# Patient Record
Sex: FEMALE | Race: WHITE | NOT HISPANIC OR LATINO | Employment: FULL TIME | URBAN - METROPOLITAN AREA
[De-identification: names, ages, dates, MRNs, and addresses within clinical notes are randomized per-mention and may not be internally consistent; named-entity substitution may affect disease eponyms.]

---

## 2023-05-30 ENCOUNTER — TELEPHONE (OUTPATIENT)
Dept: PHARMACY | Facility: HOSPITAL | Age: 73
End: 2023-05-30
Payer: MEDICARE

## 2023-05-30 NOTE — TELEPHONE ENCOUNTER
Population Health: Outreach by Ambulatory Pharmacy Team    Payor: [place] MENEZES  Reason: Adherence  Medication: Rosuvastatin 10mg and Lisinopril/Hydrochlorothiazide 10/12.5mg  Outcome: Left Voicemail    Please reach out to the Clinical Pharmacy Team if there are any further questions.     Natasha Hanson, PharmD   Meds PGY1 Pharmacy Resident   [phone]

## 2023-09-21 PROBLEM — M81.0 AGE RELATED OSTEOPOROSIS: Status: ACTIVE | Noted: 2023-09-21

## 2023-09-21 PROBLEM — L57.0 ACTINIC KERATOSIS: Status: ACTIVE | Noted: 2023-08-09

## 2023-09-21 PROBLEM — C50.912 BILATERAL BREAST CANCER (MULTI): Status: ACTIVE | Noted: 2023-09-21

## 2023-09-21 PROBLEM — D18.01 HEMANGIOMA OF SKIN AND SUBCUTANEOUS TISSUE: Status: ACTIVE | Noted: 2023-08-09

## 2023-09-21 PROBLEM — D22.5 MELANOCYTIC NEVI OF TRUNK: Status: ACTIVE | Noted: 2023-08-09

## 2023-09-21 PROBLEM — R79.89 LOW VITAMIN D LEVEL: Status: ACTIVE | Noted: 2023-09-21

## 2023-09-21 PROBLEM — L30.9 DERMATITIS, UNSPECIFIED: Status: ACTIVE | Noted: 2023-08-09

## 2023-09-21 PROBLEM — N90.89 VULVAL LESION: Status: ACTIVE | Noted: 2023-09-21

## 2023-09-21 PROBLEM — L85.3 XEROSIS CUTIS: Status: ACTIVE | Noted: 2023-08-09

## 2023-09-21 PROBLEM — L82.1 OTHER SEBORRHEIC KERATOSIS: Status: ACTIVE | Noted: 2023-08-09

## 2023-09-21 PROBLEM — L91.8 OTHER HYPERTROPHIC DISORDERS OF THE SKIN: Status: ACTIVE | Noted: 2023-08-09

## 2023-09-21 PROBLEM — R73.01 ELEVATED FASTING GLUCOSE: Status: ACTIVE | Noted: 2023-09-21

## 2023-09-21 PROBLEM — L81.4 OTHER MELANIN HYPERPIGMENTATION: Status: ACTIVE | Noted: 2023-08-09

## 2023-09-21 PROBLEM — I10 ACCELERATED HYPERTENSION: Status: ACTIVE | Noted: 2023-09-21

## 2023-09-21 PROBLEM — E78.00 HYPERCHOLESTEROLEMIA: Status: ACTIVE | Noted: 2023-09-21

## 2023-09-21 PROBLEM — E55.9 VITAMIN D DEFICIENCY: Status: ACTIVE | Noted: 2023-09-21

## 2023-09-21 PROBLEM — C50.911 BILATERAL BREAST CANCER (MULTI): Status: ACTIVE | Noted: 2023-09-21

## 2023-09-21 PROBLEM — D22.70 MELANOCYTIC NEVI OF UNSPECIFIED LOWER LIMB, INCLUDING HIP: Status: ACTIVE | Noted: 2023-08-09

## 2023-09-21 PROBLEM — L73.8 OTHER SPECIFIED FOLLICULAR DISORDERS: Status: ACTIVE | Noted: 2023-08-09

## 2023-09-21 PROBLEM — I10 BENIGN ESSENTIAL HYPERTENSION: Status: ACTIVE | Noted: 2023-09-21

## 2023-09-21 PROBLEM — D23.9 OTHER BENIGN NEOPLASM OF SKIN, UNSPECIFIED: Status: ACTIVE | Noted: 2023-08-09

## 2023-09-21 PROBLEM — L71.8 ACNE ROSACEA, PAPULAR TYPE: Status: ACTIVE | Noted: 2023-09-21

## 2023-09-21 PROBLEM — D22.60 MELANOCYTIC NEVI OF UNSPECIFIED UPPER LIMB, INCLUDING SHOULDER: Status: ACTIVE | Noted: 2023-08-09

## 2023-09-21 RX ORDER — ANASTROZOLE 1 MG/1
1 TABLET ORAL DAILY
COMMUNITY

## 2023-09-21 RX ORDER — LISINOPRIL AND HYDROCHLOROTHIAZIDE 10; 12.5 MG/1; MG/1
1 TABLET ORAL DAILY
COMMUNITY
Start: 2021-06-11 | End: 2023-10-13 | Stop reason: SDUPTHER

## 2023-09-21 RX ORDER — LANOLIN ALCOHOL/MO/W.PET/CERES
1 CREAM (GRAM) TOPICAL DAILY
COMMUNITY
End: 2023-10-26 | Stop reason: ALTCHOICE

## 2023-09-21 RX ORDER — FLUTICASONE PROPIONATE 50 MCG
1 SPRAY, SUSPENSION (ML) NASAL 2 TIMES DAILY
COMMUNITY
Start: 2022-09-30 | End: 2023-10-26 | Stop reason: ALTCHOICE

## 2023-09-21 RX ORDER — MULTIVIT WITH MINERALS/HERBS
1 TABLET ORAL 2 TIMES DAILY
COMMUNITY
Start: 2022-09-30 | End: 2023-10-26 | Stop reason: ALTCHOICE

## 2023-09-21 RX ORDER — TRIAMTERENE/HYDROCHLOROTHIAZID 37.5-25 MG
1 TABLET ORAL EVERY MORNING
COMMUNITY
End: 2023-10-13 | Stop reason: SDUPTHER

## 2023-09-21 RX ORDER — PNV NO.95/FERROUS FUM/FOLIC AC 28MG-0.8MG
1 TABLET ORAL DAILY
COMMUNITY
Start: 2022-09-30 | End: 2023-10-26 | Stop reason: ALTCHOICE

## 2023-09-21 RX ORDER — ACETAMINOPHEN 500 MG
2 TABLET ORAL
COMMUNITY
Start: 2022-09-30

## 2023-09-21 RX ORDER — CHOLECALCIFEROL (VITAMIN D3) 50 MCG
2000 TABLET ORAL DAILY
COMMUNITY

## 2023-09-21 RX ORDER — IBUPROFEN 100 MG/5ML
1 SUSPENSION, ORAL (FINAL DOSE FORM) ORAL DAILY
COMMUNITY
Start: 2022-09-30

## 2023-09-21 RX ORDER — METRONIDAZOLE 7.5 MG/G
1 CREAM TOPICAL
COMMUNITY
Start: 2018-11-19 | End: 2023-10-26 | Stop reason: ALTCHOICE

## 2023-09-21 RX ORDER — ROSUVASTATIN CALCIUM 10 MG/1
10 TABLET, COATED ORAL EVERY EVENING
COMMUNITY
Start: 2022-12-27 | End: 2023-10-13 | Stop reason: SDUPTHER

## 2023-09-21 RX ORDER — MINERAL OIL
1 ENEMA (ML) RECTAL DAILY
COMMUNITY
Start: 2022-09-30 | End: 2023-10-13

## 2023-09-21 RX ORDER — HYDROCORTISONE 25 MG/G
1 CREAM TOPICAL
COMMUNITY
Start: 2019-07-19

## 2023-09-21 RX ORDER — ZINC GLUCONATE 50 MG
1 TABLET ORAL DAILY
COMMUNITY
Start: 2022-09-30 | End: 2023-10-26 | Stop reason: ALTCHOICE

## 2023-10-13 ENCOUNTER — OFFICE VISIT (OUTPATIENT)
Dept: PRIMARY CARE | Facility: CLINIC | Age: 73
End: 2023-10-13
Payer: MEDICARE

## 2023-10-13 VITALS
WEIGHT: 171.96 LBS | RESPIRATION RATE: 16 BRPM | SYSTOLIC BLOOD PRESSURE: 120 MMHG | TEMPERATURE: 98 F | DIASTOLIC BLOOD PRESSURE: 70 MMHG | HEART RATE: 88 BPM | OXYGEN SATURATION: 98 % | BODY MASS INDEX: 29.06 KG/M2

## 2023-10-13 DIAGNOSIS — C50.111 MALIGNANT NEOPLASM OF CENTRAL PORTION OF BOTH BREASTS IN FEMALE, ESTROGEN RECEPTOR POSITIVE (MULTI): ICD-10-CM

## 2023-10-13 DIAGNOSIS — R79.89 LOW VITAMIN D LEVEL: ICD-10-CM

## 2023-10-13 DIAGNOSIS — I10 BENIGN ESSENTIAL HYPERTENSION: Primary | ICD-10-CM

## 2023-10-13 DIAGNOSIS — Z00.00 MEDICARE ANNUAL WELLNESS VISIT, SUBSEQUENT: ICD-10-CM

## 2023-10-13 DIAGNOSIS — C50.112 MALIGNANT NEOPLASM OF CENTRAL PORTION OF BOTH BREASTS IN FEMALE, ESTROGEN RECEPTOR POSITIVE (MULTI): ICD-10-CM

## 2023-10-13 DIAGNOSIS — Z17.0 MALIGNANT NEOPLASM OF CENTRAL PORTION OF BOTH BREASTS IN FEMALE, ESTROGEN RECEPTOR POSITIVE (MULTI): ICD-10-CM

## 2023-10-13 DIAGNOSIS — E78.00 HYPERCHOLESTEROLEMIA: ICD-10-CM

## 2023-10-13 PROCEDURE — 3078F DIAST BP <80 MM HG: CPT | Performed by: INTERNAL MEDICINE

## 2023-10-13 PROCEDURE — 3074F SYST BP LT 130 MM HG: CPT | Performed by: INTERNAL MEDICINE

## 2023-10-13 PROCEDURE — 1170F FXNL STATUS ASSESSED: CPT | Performed by: INTERNAL MEDICINE

## 2023-10-13 PROCEDURE — 99214 OFFICE O/P EST MOD 30 MIN: CPT | Performed by: INTERNAL MEDICINE

## 2023-10-13 PROCEDURE — 1036F TOBACCO NON-USER: CPT | Performed by: INTERNAL MEDICINE

## 2023-10-13 PROCEDURE — G0439 PPPS, SUBSEQ VISIT: HCPCS | Performed by: INTERNAL MEDICINE

## 2023-10-13 PROCEDURE — 1159F MED LIST DOCD IN RCRD: CPT | Performed by: INTERNAL MEDICINE

## 2023-10-13 RX ORDER — TRIAMTERENE/HYDROCHLOROTHIAZID 37.5-25 MG
1 TABLET ORAL EVERY MORNING
Qty: 90 TABLET | Refills: 1 | Status: SHIPPED | OUTPATIENT
Start: 2023-10-13 | End: 2023-10-13 | Stop reason: ALTCHOICE

## 2023-10-13 RX ORDER — LISINOPRIL AND HYDROCHLOROTHIAZIDE 10; 12.5 MG/1; MG/1
1 TABLET ORAL DAILY
Qty: 90 TABLET | Refills: 1 | Status: SHIPPED | OUTPATIENT
Start: 2023-10-13 | End: 2024-05-28

## 2023-10-13 RX ORDER — ROSUVASTATIN CALCIUM 10 MG/1
10 TABLET, COATED ORAL EVERY EVENING
Qty: 90 TABLET | Refills: 1 | Status: SHIPPED | OUTPATIENT
Start: 2023-10-13 | End: 2024-04-26

## 2023-10-13 ASSESSMENT — ENCOUNTER SYMPTOMS
CARDIOVASCULAR NEGATIVE: 1
RESPIRATORY NEGATIVE: 1
GASTROINTESTINAL NEGATIVE: 1
EYES NEGATIVE: 1
CONSTITUTIONAL NEGATIVE: 1
ENDOCRINE NEGATIVE: 1
NEUROLOGICAL NEGATIVE: 1
ALLERGIC/IMMUNOLOGIC NEGATIVE: 1
HEMATOLOGIC/LYMPHATIC NEGATIVE: 1
MUSCULOSKELETAL NEGATIVE: 1
PSYCHIATRIC NEGATIVE: 1

## 2023-10-13 ASSESSMENT — ACTIVITIES OF DAILY LIVING (ADL)
BATHING: INDEPENDENT
ADEQUATE_TO_COMPLETE_ADL: YES
PATIENT'S MEMORY ADEQUATE TO SAFELY COMPLETE DAILY ACTIVITIES?: YES
TOILETING: INDEPENDENT
HEARING - LEFT EAR: FUNCTIONAL
DRESSING YOURSELF: INDEPENDENT
HEARING - RIGHT EAR: FUNCTIONAL
GROOMING: INDEPENDENT
FEEDING YOURSELF: INDEPENDENT
JUDGMENT_ADEQUATE_SAFELY_COMPLETE_DAILY_ACTIVITIES: YES
WALKS IN HOME: INDEPENDENT

## 2023-10-13 NOTE — ASSESSMENT & PLAN NOTE
HTN - hypertension well/controlled .Target BP < 130/80  achieved. Educate low salt diet and exercise with weight loss. Educate home self monitoring and diary keeping. Educate risks of elevate blood pressure and benefits of prompt treatment.  Refill Lisinopril and hydrochlorothiazide

## 2023-10-13 NOTE — PROGRESS NOTES
Subjective   Reason for Visit: Nae Eli is an 73 y.o. female here for a Medicare Wellness visit.          Reviewed all medications by prescribing practitioner or clinical pharmacist (such as prescriptions, OTCs, herbal therapies and supplements) and documented in the medical record.    HPI    Patient Care Team:  Bart Alonso MD as PCP - General  Bart Alonso MD as PCP - [employer]vantage PCP     Review of Systems    Objective   Vitals:  Pulse 88   Temp 36.7 °C (98 °F)   Resp 16   Wt 78 kg (171 lb 15.3 oz)   SpO2 98%   BMI 29.06 kg/m²       Physical Exam    Assessment/Plan   Problem List Items Addressed This Visit       Benign essential hypertension - Primary    Hypercholesterolemia

## 2023-10-13 NOTE — PROGRESS NOTES
Subjective   Patient ID: Nae Eli is a 73 y.o. female who presents for Medicare Annual Wellness Visit Subsequent.    HPI     Review of Systems   Constitutional: Negative.    HENT: Negative.     Eyes: Negative.    Respiratory: Negative.     Cardiovascular: Negative.    Gastrointestinal: Negative.    Endocrine: Negative.    Musculoskeletal: Negative.    Skin: Negative.    Allergic/Immunologic: Negative.    Neurological: Negative.    Hematological: Negative.    Psychiatric/Behavioral: Negative.     All other systems reviewed and are negative.      Objective   Pulse 88   Temp 36.7 °C (98 °F)   Resp 16   Wt 78 kg (171 lb 15.3 oz)   SpO2 98%   BMI 29.06 kg/m²   Blood pressure 120/70, pulse 88, temperature 36.7 °C (98 °F), resp. rate 16, weight 78 kg (171 lb 15.3 oz), SpO2 98 %.   Physical Exam  Vitals and nursing note reviewed.   Constitutional:       Appearance: Normal appearance.   HENT:      Head: Normocephalic and atraumatic.      Right Ear: Tympanic membrane, ear canal and external ear normal.      Left Ear: Tympanic membrane, ear canal and external ear normal. There is no impacted cerumen.      Nose: Nose normal.      Mouth/Throat:      Mouth: Mucous membranes are moist.      Pharynx: Oropharynx is clear.   Eyes:      Extraocular Movements: Extraocular movements intact.      Conjunctiva/sclera: Conjunctivae normal.      Pupils: Pupils are equal, round, and reactive to light.   Cardiovascular:      Rate and Rhythm: Normal rate and regular rhythm.      Pulses: Normal pulses.      Heart sounds: Normal heart sounds. No murmur heard.  Pulmonary:      Effort: Pulmonary effort is normal. No respiratory distress.      Breath sounds: Normal breath sounds. No stridor. No wheezing, rhonchi or rales.   Chest:      Chest wall: No tenderness.   Abdominal:      General: Abdomen is flat. Bowel sounds are normal. There is no distension.      Palpations: Abdomen is soft. There is no mass.      Tenderness: There is no  abdominal tenderness. There is no right CVA tenderness, left CVA tenderness, guarding or rebound.      Hernia: No hernia is present.   Musculoskeletal:         General: Normal range of motion.      Cervical back: Normal range of motion and neck supple.   Skin:     General: Skin is warm.      Capillary Refill: Capillary refill takes less than 2 seconds.   Neurological:      General: No focal deficit present.      Mental Status: She is alert.      Cranial Nerves: No cranial nerve deficit.      Sensory: No sensory deficit.      Motor: No weakness.      Coordination: Coordination normal.      Gait: Gait normal.      Deep Tendon Reflexes: Reflexes normal.   Psychiatric:         Mood and Affect: Mood normal.         Behavior: Behavior normal. Behavior is cooperative.         Thought Content: Thought content normal.         Judgment: Judgment normal.         Assessment/Plan   Problem List Items Addressed This Visit             ICD-10-CM    Benign essential hypertension - Primary I10     HTN - hypertension well/controlled .Target BP < 130/80  achieved. Educate low salt diet and exercise with weight loss. Educate home self monitoring and diary keeping. Educate risks of elevate blood pressure and benefits of prompt treatment.  Refill Lisinopril and hydrochlorothiazide             Relevant Medications    lisinopriL-hydrochlorothiazide 10-12.5 mg tablet    Bilateral breast cancer (CMS/HCC) C50.911, C50.912     Follows with oncology and continues the Anastrozole          Hypercholesterolemia E78.00     Hypercholesterolemia - Monitor lipid profile and educate patient upon risks of high cholesterol and targets. Educate diet and change in lifestyle and increase in exercises - Refill:  Rosuvastatin  and educate compliance with medication and diet.           Relevant Medications    rosuvastatin (Crestor) 10 mg tablet    Low vitamin D level R79.89     Check vitamin D level and supplement          Medicare annual wellness visit,  subsequent Z00.00     [facility] - No recent hospitalizations.    All medications reviewed and reconciled by me the provider..  No use of controlled substances or opiates.    Family history, social history reviewed, no changes.    Patient does not smoke.    Patient does not drink.    Patient hydrates adequately daily.  Eats a well-balanced healthy diet.     Exercises adequately including walking and doing weightbearing exercises.    Patient denies any difficulty with memory or cognition.     Denies any difficulty with hearing.  Patient does not wear hearing aids.    No fall risk.  No recent falls.  Denies any difficulty walking.    Patient with no history of depression anxiety, denies any loss of interest, no feeling of sadness, no lack of motivation.    Patient is independent in all ADLs and IADLs.  Independent bathing, dressing, walking.  Takes care of own finances, shopping and cooking.     End-of-life decision-making power of  reviewed with patient.     Preventive measures - Recommend ASAP : PAP/Mamogram and refer patient to GYN. Specialist. Colonoscopy (educate patient risks of colon cancer) refer patient to GI specialist. Ophthalmology and retina exam recommend yearly exams refer patient to an Ophthalmologist.     Vaccination schedule - had Shingles and Pneumonia refuses the Flu and Covid vaccination

## 2023-10-13 NOTE — ASSESSMENT & PLAN NOTE
[facility] - No recent hospitalizations.    All medications reviewed and reconciled by me the provider..  No use of controlled substances or opiates.    Family history, social history reviewed, no changes.    Patient does not smoke.    Patient does not drink.    Patient hydrates adequately daily.  Eats a well-balanced healthy diet.     Exercises adequately including walking and doing weightbearing exercises.    Patient denies any difficulty with memory or cognition.     Denies any difficulty with hearing.  Patient does not wear hearing aids.    No fall risk.  No recent falls.  Denies any difficulty walking.    Patient with no history of depression anxiety, denies any loss of interest, no feeling of sadness, no lack of motivation.    Patient is independent in all ADLs and IADLs.  Independent bathing, dressing, walking.  Takes care of own finances, shopping and cooking.     End-of-life decision-making power of  reviewed with patient.     Preventive measures - Recommend ASAP : PAP/Mamogram and refer patient to GYN. Specialist. Colonoscopy (educate patient risks of colon cancer) refer patient to GI specialist. Ophthalmology and retina exam recommend yearly exams refer patient to an Ophthalmologist.     Vaccination schedule - had Shingles and Pneumonia refuses the Flu and Covid vaccination

## 2023-10-13 NOTE — ASSESSMENT & PLAN NOTE
Hypercholesterolemia - Monitor lipid profile and educate patient upon risks of high cholesterol and targets. Educate diet and change in lifestyle and increase in exercises - Refill:  Rosuvastatin  and educate compliance with medication and diet.

## 2023-10-20 ENCOUNTER — APPOINTMENT (OUTPATIENT)
Dept: HEMATOLOGY/ONCOLOGY | Facility: CLINIC | Age: 73
End: 2023-10-20
Payer: MEDICARE

## 2023-10-26 ENCOUNTER — OFFICE VISIT (OUTPATIENT)
Dept: HEMATOLOGY/ONCOLOGY | Facility: CLINIC | Age: 73
End: 2023-10-26
Payer: MEDICARE

## 2023-10-26 VITALS
TEMPERATURE: 98.2 F | HEART RATE: 73 BPM | BODY MASS INDEX: 29.06 KG/M2 | WEIGHT: 171.96 LBS | DIASTOLIC BLOOD PRESSURE: 88 MMHG | SYSTOLIC BLOOD PRESSURE: 134 MMHG | OXYGEN SATURATION: 98 %

## 2023-10-26 DIAGNOSIS — Z85.3 HISTORY OF RIGHT BREAST CANCER: Primary | ICD-10-CM

## 2023-10-26 PROCEDURE — 1159F MED LIST DOCD IN RCRD: CPT | Performed by: NURSE PRACTITIONER

## 2023-10-26 PROCEDURE — 1036F TOBACCO NON-USER: CPT | Performed by: NURSE PRACTITIONER

## 2023-10-26 PROCEDURE — 99214 OFFICE O/P EST MOD 30 MIN: CPT | Performed by: NURSE PRACTITIONER

## 2023-10-26 PROCEDURE — 1126F AMNT PAIN NOTED NONE PRSNT: CPT | Performed by: NURSE PRACTITIONER

## 2023-10-26 PROCEDURE — 3075F SYST BP GE 130 - 139MM HG: CPT | Performed by: NURSE PRACTITIONER

## 2023-10-26 PROCEDURE — 3079F DIAST BP 80-89 MM HG: CPT | Performed by: NURSE PRACTITIONER

## 2023-10-26 ASSESSMENT — PAIN SCALES - GENERAL: PAINLEVEL: 0-NO PAIN

## 2023-10-26 NOTE — PATIENT INSTRUCTIONS
1. Exercise 2.5 hours per week; bone strengthening, cardio-vascular, resistance training.  2. Please do self breast exams monthly.  3. Keep alcohol under 3 drinks per week.  4. Sun safety - limit sun exposure from 11a-2p when its at its hottest, apply 15-30 sun block and re-apply every 1-2 hours if perspiring or swimming.  5. Eat a plant based diet, add in oily fishes such as mackerel, tuna, and salmon.  6. Get in at least 1,000 mg of calcium per day through diet or supplement for bone strength. Examples of foods higher in calcium are milk, yogurt, fruited yogurt, oranges, fortified orange juice, almonds, almond milk, broccoli, spinach, bok jeanne, mustard greens, puddings, custards, ice cream, fortified cereals, bars, and crackers.   7. Continue anastrozole 1mg daily.   8. Schedule your bone density at your convenience in the spring or summer.  9. See one of your surgical NP's after 4/14/24 with your mammogram.  10. Please call the office if any new mass or rash in or around breast, or any uncontrolled symptoms that last over 2-3 weeks.  11. It was nice seeing you today, Maci. I will see you back in one year.   Have a Happy, Healthy, Holiday Season!   Thank you for choosing [facility] for your care.

## 2023-10-26 NOTE — PROGRESS NOTES
Improved. Management per urology.   Oncology Follow-Up    Nae Eli  [id number]           AJCC Edition: 8th (AJCC), Diagnosis Date: Sep 2021, IA, pT1c pN0 cM0 G2         Breast         AJCC Edition: 8th (AJCC), Diagnosis Date: Sep 2021, IA, pT1b pN0 cM0 G2  Oncology History    No history exists.   Nae Eli is a very pleasant 72 year old  female with a history of bilateral breast cancer diagnosed in August 2021.   1. She underwent bilateral breast lumpectomies and bilateral sentinel lymph node biopsies in September 2021.   2. Right side: 2 cm invasive ductal carcinoma, grade 2, ER+>95%, PA+95%, HER2 negative, 0/3 sentinel lymph nodes involved, T1cN0, Stage IA. Mammaprint  low risk luminal A +0.243.   3. Left side: 0.9 cm invasive ductal carcinoma, grade 2, ER+>95%, PA+95%, HER2 negative, 0/1 sentinel lymph nodes involved, T1bN0, Stage IA.  3. Mammaprint low risk luminal A +0.015.  4. She completed radiation to both sides in February 2022.  5. She initiated endocrine therapy with Anastrozole February 2022 and continues on anastrozole.       Mickey Lux presents for her routine oncology follow up. She continues on anastrozole with good tolerance. She is walking for exercise and doing monthly Breast self exams. She saw the dermatologist for a rash/breakout on her breast which is helped by a cream that was prescribed. Maci sees Dr. Alonso for HTN checks every 3-4 months. [facility] [place]        Objective        Vitals:    10/26/23 0914   BP: 134/88   Pulse: 73   Temp: 36.8 °C (98.2 °F)   SpO2: 98%        Constitutional: Well developed, alert/oriented x3, no distress, cooperative   Eyes: clear sclera   ENMT: mucous membranes moist, no apparent lesions   Head/Neck: Neck supple, no bruits   Respiratory/Thorax: Patent airways, normal breath sounds with good chest expansion   Cardiovascular: Regular rate and rhythm, no murmurs, 2+ equal pulses of the extremities,   Gastrointestinal: Nondistended, soft, non-tender, no masses palpable, no  organomegaly   Musculoskeletal: ROM intact, no joint swelling, normal strength   Extremities: normal extremities, no edema, cyanosis, contusions or wounds   Neurological: alert and oriented x3,  normal strength   Breast:     Lymphatic: No significant lymphadenopathy   Psychological: Appropriate mood and behavior   Skin: Warm and dry, no lesions, no rashes      Physical Exam     Lab Results   Component Value Date    WBC 7.8 09/30/2022    HGB 12.9 09/30/2022    HCT 39.6 09/30/2022     09/30/2022     09/30/2022       Chemistry    Lab Results   Component Value Date/Time     09/30/2022 0959    K 4.0 09/30/2022 0959     09/30/2022 0959    CO2 27 09/30/2022 0959    BUN 19 09/30/2022 0959    CREATININE 0.93 09/30/2022 0959    Lab Results   Component Value Date/Time    CALCIUM 9.5 09/30/2022 0959    ALKPHOS 57 09/30/2022 0959    AST 16 09/30/2022 0959    ALT 14 09/30/2022 0959    BILITOT 0.4 09/30/2022 0959              Imaging:  Narrative & Impression   Interpreted By:  JEFFREY COTTER MD  MRN: [id number]  Patient Name: ZAINAB DORSEY     STUDY:  DIGITAL DIAG MAMM BILAT WITH ROSI;  4/14/2023 9:43 am     ACCESSION NUMBER(S):  [id number]     ORDERING CLINICIAN:  CAMMY SAMPSON     INDICATION:  History of bilateral lumpectomies and radiation.     COMPARISON:  04/08/2022, 09/15/2021, and priors dating back to 2008     FINDINGS:  2D and tomosynthesis images were reviewed at 1 mm slice thickness.     There are areas of scattered fibroglandular tissue.  Postsurgical  parenchymal scarring and surgical clips are again seen in the central  inferior right breast at middle depth. Postsurgical scarring and  surgical clips are also again seen in the superolateral to posterior  depth. Bilateral breast diffuse skin thickening is again seen, right  more than left, in keeping with history of radiation treatment. No  suspicious masses or calcifications are identified in either breast.     IMPRESSION:  No mammographic  evidence of malignancy.     BI-RADS CATEGORY:     Category: 2 - Benign.  Recommendation: Annual mammogram           Assessment/Plan    Diagnoses and all orders for this visit:  History of right breast cancer  -     Clinic Appointment Request Follow up; Future  -     XR DEXA bone density; Future  -     Clinic Appointment Request Follow Up; Future  -     BI mammo bilateral screening tomosynthesis; Future      Maci is a 72yo woman with a hx of bilateral breast cancer diagnosed in September 2021. She is s/p partial mastectomy's, XRT, and is currently on anastrozole with good tolerance. There is no evidence of recurrent disease on today's exam.   Plan:  Exam is negative.  Continue anastrozole 1mg daily.  DXA scan due in spring, will schedule today.  Encouraged monthly breast self exams, plant based diet, keep alcohol <3 drinks/week, exercise at least 2.5 hours/week.   We reviewed signs/symptoms of recurrence including new masses, new pigmented lesion, tugging or pulling of the skin, nipple discharge, rash in or around the chest area, or any new finding that doesn't resolve within a 2-3 weeks.   All of Maci's questions/concerns were addressed.  Over 25 minutes of time was spent with this patient with >50% of the time with education, counseling, and coordination of care.   Maci will see one of our surgical NP's in April with her mammogram.  I will see her back in one year.    Erin Broussard, JUNE-CNP

## 2023-10-27 ENCOUNTER — APPOINTMENT (OUTPATIENT)
Dept: RADIATION ONCOLOGY | Facility: CLINIC | Age: 73
End: 2023-10-27
Payer: MEDICARE

## 2024-01-19 ENCOUNTER — APPOINTMENT (OUTPATIENT)
Dept: PRIMARY CARE | Facility: CLINIC | Age: 74
End: 2024-01-19
Payer: MEDICARE

## 2024-01-24 ENCOUNTER — APPOINTMENT (OUTPATIENT)
Dept: PRIMARY CARE | Facility: CLINIC | Age: 74
End: 2024-01-24
Payer: MEDICARE

## 2024-01-26 ENCOUNTER — APPOINTMENT (OUTPATIENT)
Dept: PRIMARY CARE | Facility: CLINIC | Age: 74
End: 2024-01-26
Payer: MEDICARE

## 2024-02-09 ENCOUNTER — HOSPITAL ENCOUNTER (OUTPATIENT)
Dept: RADIATION ONCOLOGY | Facility: CLINIC | Age: 74
Setting detail: RADIATION/ONCOLOGY SERIES
Discharge: HOME | End: 2024-02-09
Payer: MEDICARE

## 2024-02-09 VITALS
HEART RATE: 82 BPM | RESPIRATION RATE: 18 BRPM | SYSTOLIC BLOOD PRESSURE: 152 MMHG | WEIGHT: 174.16 LBS | BODY MASS INDEX: 29.43 KG/M2 | OXYGEN SATURATION: 95 % | DIASTOLIC BLOOD PRESSURE: 90 MMHG | TEMPERATURE: 97.7 F

## 2024-02-09 DIAGNOSIS — C50.111 MALIGNANT NEOPLASM OF CENTRAL PORTION OF BOTH BREASTS IN FEMALE, ESTROGEN RECEPTOR POSITIVE (MULTI): Primary | ICD-10-CM

## 2024-02-09 DIAGNOSIS — C50.112 MALIGNANT NEOPLASM OF CENTRAL PORTION OF BOTH BREASTS IN FEMALE, ESTROGEN RECEPTOR POSITIVE (MULTI): Primary | ICD-10-CM

## 2024-02-09 DIAGNOSIS — Z17.0 MALIGNANT NEOPLASM OF CENTRAL PORTION OF BOTH BREASTS IN FEMALE, ESTROGEN RECEPTOR POSITIVE (MULTI): Primary | ICD-10-CM

## 2024-02-09 PROCEDURE — 99213 OFFICE O/P EST LOW 20 MIN: CPT | Performed by: NURSE PRACTITIONER

## 2024-02-09 ASSESSMENT — PAIN SCALES - GENERAL: PAINLEVEL: 0-NO PAIN

## 2024-02-09 NOTE — PROGRESS NOTES
Radiation Oncology Follow-Up    Patient Name:  Nae Eli  MRN:  [id number]  :  1950    Referring Provider: No ref. provider found  Primary Care Provider: Bart Alonso MD  Care Team: Patient Care Team:  Bart Aolnso MD as PCP - General  Bart Alonso MD as PCP - [employer]vantage PCP    Date of Service: 2024     Cancer Staging:          Breast         AJCC Edition: 8th (AJCC), Diagnosis Date: Sep 2021, IA, pT1c pN0 cM0 G2         Breast         AJCC Edition: 8th (AJCC), Diagnosis Date: Sep 2021, IA, pT1b pN0 cM0 G2     Treatment Synopsis:    73-year-old female who appreciated a mass in her right breast in .       - 2021 she underwent bilateral mammograms.  There was a mass in the lower inner quadrant of the right breast with associated architectural distortion as well as a mass in the central left breast and a mass in the upper outer quadrant of the left  breast with associated architectural distortion.   - 2021 she underwent bilateral breast ultrasounds.  Ultrasound of the right directed at the 4 o'clock position, 2 cm from the nipple revealed a hypoechoic lobulated mass.  In the left breast at the 2 o'clock position, 2 cm from the nipple was an  anechoic mass.  At the 1 o'clock position, 6 cm from the nipple was a 0.5 x 0.7 x 0.4 cm hypoechoic mass.   - 8/10/2021 she underwent bilateral breast biopsies.  Pathology from the right revealed invasive ductal carcinoma with focal mucin production.  Estrogen receptors stained positive at greater than 95%.  Progesterone receptors stained positive at 95%.   HER-2/tori was 1+ on IHC.  Left breast core biopsy directed at the 1 o'clock position, 6 cm from the nipple revealed invasive ductal carcinoma, grade 2.  Estrogen and progesterone receptors stained positive at greater than 95%.  HER-2/tori was 1+ on IHC.     - 9/15/2021 she underwent breast conserving surgery bilaterally.  Pathology from the right revealed a  "2 cm invasive ductal carcinoma, grade 2.  No angiolymphatic invasion was present.  There was no ductal carcinoma in situ.  The resection margins were  negative with tumor microns from the lateral margin.  3 sentinel lymph nodes were removed, all of which were negative for metastatic disease (0/3).  Pathology from the left revealed a 0.9 cm invasive ductal carcinoma, grade 2.  No angiolymphatic invasion  was present.  There was no ductal carcinoma in situ.  Tumor was microns from the anterior margin of resection.  1 sentinel lymph node was removed which was negative for metastatic disease (0/1).  Her tumors were sent for MammaPrint testing and both returned  as low risk luminal A.   -12/2/2021 through 12/30/2021:  radiation therapy to right and left breast:        -The right breast received a dose of 42.56 Gray/2.66 Gray per fraction/16 fractions Treatment area #2:         - Right breast tumor bed with additional 10 Gray/2.5 Gray per fraction/4 fractions for a total of 52.56 Engle           - The left breast received a dose of 42.56 Gray/2.66 Gray per fraction/16 fractions         - The left breast tumor bed received an additional 10 Gray/2.5 Gray per fraction/4 fractions     SUBJECTIVE  History of Present Illness:   Nae Eli is here today for routine radiation follow up. She is feeling well and energy baseline. She is working full time managing a dental practice. Reports skin intact both breasts. No breast pain but she has some tightness in left breast. Occasional \"twinges\" in right breast with mild thickening central/medial breast. No swelling of UEs and has good ROM. She continues anastrozole and no adverse effects. Had normal DEXA and due for scan in May. Denies headaches, fever, chills, cough, SOB, chest pain, N/V, CI complaints or bony pain. Due for mammogram in April    Review of Systems:    Review of Systems   All other systems reviewed and are negative.    Performance Status:   The Karnofsky " performance scale today is 100, Fully active, able to carry on all pre-disease performed without restriction (ECOG equivalent 0).      OBJECTIVE    Current Outpatient Medications:     anastrozole (Arimidex) 1 mg tablet, Take 1 tablet (1 mg total) by mouth once daily., Disp: , Rfl:     ascorbic acid (Vitamin C) 1,000 mg tablet, Take 1 tablet (1,000 mg) by mouth once daily., Disp: , Rfl:     cholecalciferol (Vitamin D-3) 5,000 Units tablet, Take 2 tablets (10,000 Units) by mouth once daily in the evening. Take with meals., Disp: , Rfl:     cholecalciferol (Vitamin D-3) 50 MCG (2000 UT) tablet, Take 1 tablet (2,000 Units) by mouth once daily., Disp: , Rfl:     hydrocortisone 2.5 % cream, 1 Application., Disp: , Rfl:     lisinopriL-hydrochlorothiazide 10-12.5 mg tablet, Take 1 tablet by mouth once daily., Disp: 90 tablet, Rfl: 1    rosuvastatin (Crestor) 10 mg tablet, Take 1 tablet (10 mg) by mouth once daily in the evening., Disp: 90 tablet, Rfl: 1   Physical Exam:  Physical Exam  Constitutional:       Appearance: Normal appearance.   HENT:      Head: Normocephalic and atraumatic.      Nose: Nose normal.      Mouth/Throat:      Mouth: Mucous membranes are moist.      Pharynx: Oropharynx is clear.   Eyes:      Conjunctiva/sclera: Conjunctivae normal.      Pupils: Pupils are equal, round, and reactive to light.   Cardiovascular:      Rate and Rhythm: Normal rate.   Pulmonary:      Effort: Pulmonary effort is normal.   Chest:   Breasts:     Right: Normal. No swelling, inverted nipple, mass or nipple discharge.      Left: Normal. No swelling, inverted nipple, mass or nipple discharge.      Comments: Right breast with mild thickening central medial breast consistent with radiation changes/mild fibrosis  Abdominal:      Palpations: Abdomen is soft.   Musculoskeletal:         General: No swelling. Normal range of motion.      Cervical back: Normal range of motion and neck supple.   Lymphadenopathy:      Cervical: No cervical  adenopathy.      Upper Body:      Right upper body: No supraclavicular or axillary adenopathy.      Left upper body: No supraclavicular or axillary adenopathy.   Skin:     General: Skin is warm and dry.   Neurological:      General: No focal deficit present.      Mental Status: She is alert and oriented to person, place, and time.   Psychiatric:         Mood and Affect: Mood normal.         Behavior: Behavior normal.         RESULTS:  Narrative & Impression   Interpreted By:  JEFFREY COTTER MD  MRN: [id number]  Patient Name: ZAINAB DORSEY     STUDY:  DIGITAL DIAG MAMM BILAT WITH ROSI;  4/14/2023 9:43 am     ACCESSION NUMBER(S):  [id number]     ORDERING CLINICIAN:  ISABEL SAMPSON     INDICATION:  History of bilateral lumpectomies and radiation.     COMPARISON:  04/08/2022, 09/15/2021, and priors dating back to 2008     FINDINGS:  2D and tomosynthesis images were reviewed at 1 mm slice thickness.     There are areas of scattered fibroglandular tissue.  Postsurgical  parenchymal scarring and surgical clips are again seen in the central  inferior right breast at middle depth. Postsurgical scarring and  surgical clips are also again seen in the superolateral to posterior  depth. Bilateral breast diffuse skin thickening is again seen, right  more than left, in keeping with history of radiation treatment. No  suspicious masses or calcifications are identified in either breast.     IMPRESSION:  No mammographic evidence of malignancy.     BI-RADS CATEGORY:     Category: 2 - Benign.  Recommendation: Annual mammogram        ASSESSMENT/PLAN:  73 y.o. female with bilateral breast early stage breast cancer s/p breast conserving surgery followed by adjuvant radiation to both breasts. Cosmesis is excellent. Reviewed breast lymphatic massage. She will continue anastrozole and follow up with medical oncology. Radiation follow up in 12 mo. She will follow up with Dr. Isabel Sampson for her mammogram. Call me with any questions or  concerns.     Charlotte Ba CNP  [phone]

## 2024-02-27 ENCOUNTER — OFFICE VISIT (OUTPATIENT)
Dept: PRIMARY CARE | Facility: CLINIC | Age: 74
End: 2024-02-27
Payer: MEDICARE

## 2024-02-27 VITALS
BODY MASS INDEX: 29.71 KG/M2 | SYSTOLIC BLOOD PRESSURE: 138 MMHG | DIASTOLIC BLOOD PRESSURE: 82 MMHG | RESPIRATION RATE: 16 BRPM | WEIGHT: 174 LBS | HEIGHT: 64 IN | HEART RATE: 78 BPM

## 2024-02-27 DIAGNOSIS — R73.01 ELEVATED FASTING GLUCOSE: ICD-10-CM

## 2024-02-27 DIAGNOSIS — I10 BENIGN ESSENTIAL HYPERTENSION: Primary | ICD-10-CM

## 2024-02-27 DIAGNOSIS — C51.9 VULVA CANCER (MULTI): ICD-10-CM

## 2024-02-27 DIAGNOSIS — R42 VERTIGO: ICD-10-CM

## 2024-02-27 DIAGNOSIS — E78.00 HYPERCHOLESTEROLEMIA: ICD-10-CM

## 2024-02-27 PROCEDURE — 3079F DIAST BP 80-89 MM HG: CPT | Performed by: INTERNAL MEDICINE

## 2024-02-27 PROCEDURE — 1159F MED LIST DOCD IN RCRD: CPT | Performed by: INTERNAL MEDICINE

## 2024-02-27 PROCEDURE — 1126F AMNT PAIN NOTED NONE PRSNT: CPT | Performed by: INTERNAL MEDICINE

## 2024-02-27 PROCEDURE — 3075F SYST BP GE 130 - 139MM HG: CPT | Performed by: INTERNAL MEDICINE

## 2024-02-27 PROCEDURE — 99215 OFFICE O/P EST HI 40 MIN: CPT | Performed by: INTERNAL MEDICINE

## 2024-02-27 PROCEDURE — 1036F TOBACCO NON-USER: CPT | Performed by: INTERNAL MEDICINE

## 2024-02-27 RX ORDER — METOPROLOL SUCCINATE 25 MG/1
25 TABLET, EXTENDED RELEASE ORAL DAILY
Qty: 30 TABLET | Refills: 5 | Status: SHIPPED | OUTPATIENT
Start: 2024-02-27 | End: 2024-08-25

## 2024-02-27 ASSESSMENT — ENCOUNTER SYMPTOMS
PSYCHIATRIC NEGATIVE: 1
ALLERGIC/IMMUNOLOGIC NEGATIVE: 1
HEMATOLOGIC/LYMPHATIC NEGATIVE: 1
CARDIOVASCULAR NEGATIVE: 1
ENDOCRINE NEGATIVE: 1
MUSCULOSKELETAL NEGATIVE: 1
RESPIRATORY NEGATIVE: 1
CONSTITUTIONAL NEGATIVE: 1
NEUROLOGICAL NEGATIVE: 1
GASTROINTESTINAL NEGATIVE: 1
EYES NEGATIVE: 1

## 2024-02-27 NOTE — ASSESSMENT & PLAN NOTE
Hyperglycemia  - Educate extensively low carbohydrate diet AND LOW FAT DIET AND increase in exercise activity  and weight loss program and monitor HbA1C and glucose levels associated with positive family history of diabetes on her father side

## 2024-02-27 NOTE — ASSESSMENT & PLAN NOTE
Vertigo - Dizziness -Fung Somervell sign was positive -  possibly due to sinusitis vs. otitis vs. Eustachian tube dysfunction vs. advanced cervical disc disease causing encroachment of the Vertebral arteries vs. viral infection of the Vestibular nerve sensor - recommend : Antivert /Meclizine 25 mg TID , treat infection of the sinuses or ears , increase intake of fluids, educate exercises , avoid operating machinery, monitor closely, rest.

## 2024-02-27 NOTE — PROGRESS NOTES
"Subjective   Patient ID: Nae Eli is a 73 y.o. female who presents for Follow-up (Follow up).fluctuating blood pressure and vertigo and dizziness associated with very high blood pressure     HPI     Review of Systems   Constitutional: Negative.    HENT: Negative.     Eyes: Negative.    Respiratory: Negative.     Cardiovascular: Negative.    Gastrointestinal: Negative.    Endocrine: Negative.    Musculoskeletal: Negative.    Skin: Negative.    Allergic/Immunologic: Negative.    Neurological: Negative.    Hematological: Negative.    Psychiatric/Behavioral: Negative.     All other systems reviewed and are negative.      Objective   Ht 1.626 m (5' 4\")   Wt 78.9 kg (174 lb)   BMI 29.87 kg/m²   Blood pressure 138/82, pulse 78, resp. rate 16, height 1.626 m (5' 4\"), weight 78.9 kg (174 lb).   Physical Exam  Vitals and nursing note reviewed.   Constitutional:       Appearance: Normal appearance.   HENT:      Head: Normocephalic and atraumatic.      Right Ear: Tympanic membrane, ear canal and external ear normal.      Left Ear: Tympanic membrane, ear canal and external ear normal. There is no impacted cerumen.      Nose: Nose normal.      Mouth/Throat:      Mouth: Mucous membranes are moist.      Pharynx: Oropharynx is clear.   Eyes:      Extraocular Movements: Extraocular movements intact.      Conjunctiva/sclera: Conjunctivae normal.      Pupils: Pupils are equal, round, and reactive to light.   Cardiovascular:      Rate and Rhythm: Normal rate and regular rhythm.      Pulses: Normal pulses.      Heart sounds: Normal heart sounds. No murmur heard.  Pulmonary:      Effort: Pulmonary effort is normal. No respiratory distress.      Breath sounds: Normal breath sounds. No stridor. No wheezing, rhonchi or rales.   Chest:      Chest wall: No tenderness.   Abdominal:      General: Abdomen is flat. Bowel sounds are normal. There is no distension.      Palpations: Abdomen is soft. There is no mass.      Tenderness: There " is no abdominal tenderness. There is no right CVA tenderness, left CVA tenderness, guarding or rebound.      Hernia: No hernia is present.   Musculoskeletal:         General: Normal range of motion.      Cervical back: Normal range of motion and neck supple.   Skin:     General: Skin is warm.      Capillary Refill: Capillary refill takes less than 2 seconds.   Neurological:      General: No focal deficit present.      Mental Status: She is alert.      Cranial Nerves: No cranial nerve deficit.      Sensory: No sensory deficit.      Motor: No weakness.      Coordination: Coordination normal.      Gait: Gait normal.      Deep Tendon Reflexes: Reflexes normal.   Psychiatric:         Mood and Affect: Mood normal.         Behavior: Behavior normal. Behavior is cooperative.         Thought Content: Thought content normal.         Judgment: Judgment normal.         Assessment/Plan   Problem List Items Addressed This Visit             ICD-10-CM    Benign essential hypertension - Primary I10     HTN - hypertension not well/controlled and fluctuating .Target BP < 130/80  achieved. Educate low salt diet and exercise with weight loss. Educate home self monitoring and diary keeping. Educate risks of elevate blood pressure and benefits of prompt treatment.  Refill Lisinopril and hydrochlorothiazide   start metoprolol 25 mg daily to avoid fluctuations and control vertigo          Relevant Medications    metoprolol succinate XL (Toprol-XL) 25 mg 24 hr tablet    Elevated fasting glucose R73.01     Hyperglycemia  - Educate extensively low carbohydrate diet AND LOW FAT DIET AND increase in exercise activity  and weight loss program and monitor HbA1C and glucose levels associated with positive family history of diabetes on her father side          Hypercholesterolemia E78.00     Hypercholesterolemia - Monitor lipid profile and educate patient upon risks of high cholesterol and targets. Educate diet and change in lifestyle and increase  in exercises - Refill:  Rosuvastatin  and educate compliance with medication and diet.            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 otitis vs. Eustachian tube dysfunction vs. advanced cervical disc disease causing encroachment of the Vertebral arteries vs. viral infection of the Vestibular nerve sensor - recommend : Antivert /Meclizine 25 mg TID , treat infection of the sinuses or ears , increase intake of fluids, educate exercises , avoid operating machinery, monitor closely, rest.                Benign essential hypertension - Primary I10        HTN - hypertension not well/controlled .Target BP < 130/80  achieved. Educate low salt diet and exercise with weight loss. Educate home self monitoring and diary keeping. Educate risks of elevate blood pressure and benefits of prompt treatment.  Refill Lisinopril and hydrochlorothiazide add Metoprolol 25 mg daily               Relevant Medications     lisinopriL-hydrochlorothiazide 10-12.5 mg tablet     Bilateral breast cancer (CMS/[facility]) C50.911, C50.912       Follows with oncology and continues the Anastrozole            Hypercholesterolemia E78.00       Hypercholesterolemia - Monitor lipid profile and educate patient upon risks of high cholesterol and targets. Educate diet and change in lifestyle and increase in exercises - Refill:  Rosuvastatin  and educate compliance with medication and diet.             Relevant Medications     rosuvastatin (Crestor) 10 mg tablet     Low vitamin D level R79.89       Check vitamin D level and supplement             Z00.00                                Immunizations/Injections      very important  Immunizations from outside sources need reconciliation.      Influenza, Unspecified9/14/2009  Influenza, seasonal, injectable9/30/2016  Moderna SARS-CoV-2 Vaccination7/21/2022, 11/28/2021, 3/5/2021,  ... (1 more)  Pneumococcal conjugate vaccine,  13-valent (PREVNAR 13)4/7/2017  Pneumococcal polysaccharide vaccine, 23-valent, age 2 years and older (PNEUMOVAX 23)4/15/2022  Tdap vaccine, age 7 year and older (BOOSTRIX, ADACEL)7/16/2015, 11/30/2000Immunizations/Injections      very important  Immunizations from outside sources need reconciliation.      Influenza, Unspecified9/14/2009  Influenza, seasonal, injectable9/30/2016  Moderna SARS-CoV-2 Vaccination7/21/2022, 11/28/2021, 3/5/2021,  ... (1 more)  Pneumococcal conjugate vaccine, 13-valent (PREVNAR 13)4/7/2017  Pneumococcal polysaccharide vaccine, 23-valent, age 2 years and older (PNEUMOVAX 23)4/15/2022  Tdap vaccine, age 7 year and older (BOOSTRIX, ADACEL)7/16/2015, 11/30/2000

## 2024-02-27 NOTE — ASSESSMENT & PLAN NOTE
HTN - hypertension not well/controlled and fluctuating .Target BP < 130/80  achieved. Educate low salt diet and exercise with weight loss. Educate home self monitoring and diary keeping. Educate risks of elevate blood pressure and benefits of prompt treatment.  Refill Lisinopril and hydrochlorothiazide   start metoprolol 25 mg daily to avoid fluctuations and control vertigo

## 2024-03-21 DIAGNOSIS — Z51.81 ENCOUNTER FOR MONITORING AROMATASE INHIBITOR THERAPY: Primary | ICD-10-CM

## 2024-03-21 DIAGNOSIS — Z79.811 ENCOUNTER FOR MONITORING AROMATASE INHIBITOR THERAPY: Primary | ICD-10-CM

## 2024-03-21 RX ORDER — ANASTROZOLE 1 MG/1
1 TABLET ORAL DAILY
Qty: 90 TABLET | Refills: 2 | Status: SHIPPED | OUTPATIENT
Start: 2024-03-21

## 2024-04-19 ENCOUNTER — OFFICE VISIT (OUTPATIENT)
Dept: HEMATOLOGY/ONCOLOGY | Facility: CLINIC | Age: 74
End: 2024-04-19
Payer: MEDICARE

## 2024-04-19 ENCOUNTER — HOSPITAL ENCOUNTER (OUTPATIENT)
Dept: RADIOLOGY | Facility: CLINIC | Age: 74
Discharge: HOME | End: 2024-04-19
Payer: MEDICARE

## 2024-04-19 VITALS
WEIGHT: 173.61 LBS | SYSTOLIC BLOOD PRESSURE: 125 MMHG | HEART RATE: 71 BPM | DIASTOLIC BLOOD PRESSURE: 79 MMHG | BODY MASS INDEX: 29.8 KG/M2

## 2024-04-19 VITALS — WEIGHT: 171 LBS | HEIGHT: 64 IN | BODY MASS INDEX: 29.19 KG/M2

## 2024-04-19 DIAGNOSIS — Z85.3 PERSONAL HISTORY OF MALIGNANT NEOPLASM OF BREAST: ICD-10-CM

## 2024-04-19 DIAGNOSIS — C50.412 MALIGNANT NEOPLASM OF UPPER-OUTER QUADRANT OF LEFT BREAST IN FEMALE, ESTROGEN RECEPTOR POSITIVE (MULTI): ICD-10-CM

## 2024-04-19 DIAGNOSIS — Z51.81 ENCOUNTER FOR MONITORING AROMATASE INHIBITOR THERAPY: ICD-10-CM

## 2024-04-19 DIAGNOSIS — Z79.811 ENCOUNTER FOR MONITORING AROMATASE INHIBITOR THERAPY: ICD-10-CM

## 2024-04-19 DIAGNOSIS — Z17.0 MALIGNANT NEOPLASM OF LOWER-INNER QUADRANT OF RIGHT BREAST OF FEMALE, ESTROGEN RECEPTOR POSITIVE (MULTI): Primary | ICD-10-CM

## 2024-04-19 DIAGNOSIS — Z85.3 HISTORY OF RIGHT BREAST CANCER: ICD-10-CM

## 2024-04-19 DIAGNOSIS — C50.311 MALIGNANT NEOPLASM OF LOWER-INNER QUADRANT OF RIGHT BREAST OF FEMALE, ESTROGEN RECEPTOR POSITIVE (MULTI): Primary | ICD-10-CM

## 2024-04-19 DIAGNOSIS — Z17.0 MALIGNANT NEOPLASM OF UPPER-OUTER QUADRANT OF LEFT BREAST IN FEMALE, ESTROGEN RECEPTOR POSITIVE (MULTI): ICD-10-CM

## 2024-04-19 DIAGNOSIS — Z08 ENCOUNTER FOR FOLLOW-UP EXAMINATION AFTER COMPLETED TREATMENT FOR MALIGNANT NEOPLASM: ICD-10-CM

## 2024-04-19 PROCEDURE — 77067 SCR MAMMO BI INCL CAD: CPT | Performed by: RADIOLOGY

## 2024-04-19 PROCEDURE — 77063 BREAST TOMOSYNTHESIS BI: CPT | Performed by: RADIOLOGY

## 2024-04-19 PROCEDURE — 1159F MED LIST DOCD IN RCRD: CPT | Performed by: NURSE PRACTITIONER

## 2024-04-19 PROCEDURE — 99215 OFFICE O/P EST HI 40 MIN: CPT | Performed by: NURSE PRACTITIONER

## 2024-04-19 PROCEDURE — 3078F DIAST BP <80 MM HG: CPT | Performed by: NURSE PRACTITIONER

## 2024-04-19 PROCEDURE — 1160F RVW MEDS BY RX/DR IN RCRD: CPT | Performed by: NURSE PRACTITIONER

## 2024-04-19 PROCEDURE — 1126F AMNT PAIN NOTED NONE PRSNT: CPT | Performed by: NURSE PRACTITIONER

## 2024-04-19 PROCEDURE — 3074F SYST BP LT 130 MM HG: CPT | Performed by: NURSE PRACTITIONER

## 2024-04-19 PROCEDURE — 77067 SCR MAMMO BI INCL CAD: CPT

## 2024-04-19 PROCEDURE — 1036F TOBACCO NON-USER: CPT | Performed by: NURSE PRACTITIONER

## 2024-04-19 ASSESSMENT — PAIN SCALES - GENERAL: PAINLEVEL: 0-NO PAIN

## 2024-04-19 NOTE — PATIENT INSTRUCTIONS
1. Exercise 2.5 hours per week; bone strengthening, cardio-vascular, resistance training.  2. Please do self breast exams monthly.  3. Keep alcohol under 3 drinks per week.  4. Sun safety - limit sun exposure from 11a-2p when its at its hottest, apply 15-30 sun block and re-apply every 1-2 hours if perspiring or swimming.  5. Eat a plant based diet, add in oily fishes such as mackerel, tuna, and salmon.  6. Get in at least 1,000 mg of calcium per day through diet or supplement for bone strength. Examples of foods higher in calcium are milk, yogurt, fruited yogurt, oranges, fortified orange juice, almonds, almond milk, broccoli, spinach, bok jeanne, mustard greens, puddings, custards, ice cream, fortified cereals, bars, and crackers.   7. Continue anastrozole 1mg daily. Your bone density is due in the fall (already scheduled)  8. Please call the office if any new mass or rash in or around breast, or any uncontrolled symptoms that last over 2-3 weeks at [phone].  9. Your exam and mammogram are negative.  10. It was nice seeing you today, Maci. I will see you back in August.   Have a nice spring and summer!  Thank you for choosing [facility] for your care.

## 2024-04-19 NOTE — PROGRESS NOTES
Oncology Follow-Up    Nae Eli  [id number]           Breast         AJCC Edition: 8th (AJCC), Diagnosis Date: Sep 2021, IA, pT1b pN0 cM0 G2           AJCC Edition: 8th (AJCC), Diagnosis Date: Sep 2021, IA, pT1c pN0 cM0 G2       Patient's Oncology History documentation      Nae Eli is a very pleasant 72 year old  female with a history of bilateral breast cancer diagnosed in August 2021.   1. She underwent bilateral breast lumpectomies and bilateral sentinel lymph node biopsies in September 2021.   2. Right side: 2 cm invasive ductal carcinoma, grade 2, ER+>95%, CT+95%, HER2 negative, 0/3 sentinel lymph nodes involved, T1cN0, Stage IA. Mammaprint  low risk luminal A +0.243.   3. Left side: 0.9 cm invasive ductal carcinoma, grade 2, ER+>95%, CT+95%, HER2 negative, 0/1 sentinel lymph nodes involved, T1bN0, Stage IA.  3. Mammaprint low risk luminal A +0.015.  4. She completed radiation to both sides in February 2022.  5. She initiated endocrine therapy with Anastrozole February 2022 and continues on anastrozole.        Mickey Lux presents for her Oncology Follow Up visit. She feels well and reports no new health issues. She has been traveling with her brother and enjoying this. She rates her energy level as 10/10 and no distress. She is helping care for a friend who is now in a nursing home and has to tell her that she cannot return home which is difficult for Maci. She continues on anastrozole with good tolerance. She sees dermatology yearly and sees Dr. Alonso every 3-4 months for blood pressure monitoring. Maci denies any unusual headaches, balance issues, depression, cough, shortness of breath, problems swallowing, changes in chest/breast area, abdominal pain, bone or muscle pain, vaginal bleeding, rectal bleeding, blood in the urine, vaginal dryness, swelling arms or legs, new or unusual skin moles or lesions.     Objective      Vitals:    04/19/24 1053   BP: 125/79   Pulse: 71         Constitutional: Well developed, alert/oriented x3, no distress, cooperative   Eyes: clear sclera   ENMT: mucous membranes moist, no apparent lesions   Head/Neck: Neck supple, no bruits   Respiratory/Thorax: Patent airways, normal breath sounds with good chest expansion   Cardiovascular: Regular rate and rhythm, no murmurs, 2+ equal pulses of the extremities,   Gastrointestinal: Nondistended, soft, non-tender, no masses palpable, no organomegaly   Musculoskeletal: ROM intact, no joint swelling, normal strength   Extremities: normal extremities, no edema, cyanosis, contusions or wounds   Neurological: alert and oriented x3,  normal strength   Breast:     Lymphatic: No significant lymphadenopathy   Psychological: Appropriate mood and behavior   Skin: Warm and dry, no lesions, no rashes      Physical Exam  Chest:          Comments: Right breast + for breast conserving surgery with well healed lower/central and right axillary incisions; no masses, nodules, skin changes, discharge. Left breast + for breast conserving surgery with well healed UOQ and left axillary incisions; no masses, nodules, skin changes, discharge          Lab Results   Component Value Date    WBC 7.8 09/30/2022    HGB 12.9 09/30/2022    HCT 39.6 09/30/2022     09/30/2022     09/30/2022       Chemistry    Lab Results   Component Value Date/Time     09/30/2022 0959    K 4.0 09/30/2022 0959     09/30/2022 0959    CO2 27 09/30/2022 0959    BUN 19 09/30/2022 0959    CREATININE 0.93 09/30/2022 0959    Lab Results   Component Value Date/Time    CALCIUM 9.5 09/30/2022 0959    ALKPHOS 57 09/30/2022 0959    AST 16 09/30/2022 0959    ALT 14 09/30/2022 0959    BILITOT 0.4 09/30/2022 0959            Narrative & Impression   =================================================================            Bone Density and Vertebral Assessment Report             =================================================================     Height:            63.0 in  Weight:           167.0 lb  Fractures:  Treatments:     -----------------------------------------------------------------     Indication: Osteopenia     Referring Provider: MAYRA GARCIAS     Study: Bone densitometry and vertebral deformity assessment         were performed.     Exam Date: November 05, 2021     Accession number: [id number]      Bone Density:  -----------------------------------------------------------------  Region                   BMD    T-score  Z-score   Classification  -----------------------------------------------------------------  AP Spine(L1-L4)          1.309    2.4      4.5       Normal  Femoral Neck (Left)      0.726   -1.1      0.7       Osteopenia  Total Hip (Left)         1.105    1.3      2.9       Normal  -----------------------------------------------------------------    Assessment/Plan    Maci is a 72 yo woman with a hx of bilateral T1bN0 breast cancers diagnosed in September 2021. She is s/p bilateral partial mastectomy's, XRT, and is currently on anastrozole with good tolerance. There is no evidence of recurrent disease on today's exam.   Plan:  Exam and mammogram are negative.  Continue anastrozole 1mg daily.   Bone density due in the fall which is already scheduled.  Encouraged monthly breast self exams, plant based diet, keep alcohol <3 drinks/week, exercise at least 2.5 hours/week.  We reviewed signs/symptoms of recurrence including new masses, new pigmented lesion, tugging or pulling of the skin, nipple discharge, rash in or around the chest area, or any new finding that doesn't resolve within a 2-3 weeks.  Over 25 minutes of time was spent with this patient with >50% of the time with education, counseling, and coordination of care.  All of Maci's questions/concerns were addressed. I will see her back in August.       Diagnoses and all orders for this visit:  Malignant neoplasm of lower-inner quadrant of right breast of female, estrogen receptor positive  (Multi)  -     Clinic Appointment Request Follow Up; [facility]; Future  History of right breast cancer  -     Clinic Appointment Request Follow Up  Malignant neoplasm of upper-outer quadrant of left breast in female, estrogen receptor positive (Multi)  -     Clinic Appointment Request Follow Up; [facility]; Future  Encounter for monitoring aromatase inhibitor therapy  -     Clinic Appointment Request Follow Up; [facility]; Future      Colonoscopy and vaccines up to date. No longer sees gynecology.    Clinton Marino, APRN-CNP

## 2024-04-26 DIAGNOSIS — E78.00 HYPERCHOLESTEROLEMIA: ICD-10-CM

## 2024-04-26 RX ORDER — ROSUVASTATIN CALCIUM 10 MG/1
10 TABLET, COATED ORAL NIGHTLY
Qty: 90 TABLET | Refills: 1 | Status: SHIPPED | OUTPATIENT
Start: 2024-04-26

## 2024-04-30 ENCOUNTER — APPOINTMENT (OUTPATIENT)
Dept: PRIMARY CARE | Facility: CLINIC | Age: 74
End: 2024-04-30
Payer: MEDICARE

## 2024-05-03 ENCOUNTER — APPOINTMENT (OUTPATIENT)
Dept: RADIOLOGY | Facility: HOSPITAL | Age: 74
End: 2024-05-03
Payer: MEDICARE

## 2024-05-17 ENCOUNTER — HOSPITAL ENCOUNTER (OUTPATIENT)
Dept: RADIOLOGY | Facility: HOSPITAL | Age: 74
Discharge: HOME | End: 2024-05-17
Payer: MEDICARE

## 2024-05-17 DIAGNOSIS — Z85.3 HISTORY OF RIGHT BREAST CANCER: ICD-10-CM

## 2024-05-17 PROCEDURE — 77080 DXA BONE DENSITY AXIAL: CPT | Performed by: RADIOLOGY

## 2024-05-17 PROCEDURE — 77080 DXA BONE DENSITY AXIAL: CPT

## 2024-05-26 DIAGNOSIS — I10 BENIGN ESSENTIAL HYPERTENSION: ICD-10-CM

## 2024-05-28 RX ORDER — LISINOPRIL AND HYDROCHLOROTHIAZIDE 10; 12.5 MG/1; MG/1
1 TABLET ORAL DAILY
Qty: 90 TABLET | Refills: 1 | Status: SHIPPED | OUTPATIENT
Start: 2024-05-28 | End: 2024-11-24

## 2024-07-12 ENCOUNTER — APPOINTMENT (OUTPATIENT)
Dept: DERMATOLOGY | Facility: CLINIC | Age: 74
End: 2024-07-12
Payer: MEDICARE

## 2024-07-12 DIAGNOSIS — L81.4 LENTIGO: ICD-10-CM

## 2024-07-12 DIAGNOSIS — L82.1 SEBORRHEIC KERATOSIS: ICD-10-CM

## 2024-07-12 DIAGNOSIS — L23.7 POISON IVY DERMATITIS: ICD-10-CM

## 2024-07-12 DIAGNOSIS — L30.9 DERMATITIS: ICD-10-CM

## 2024-07-12 DIAGNOSIS — L57.9 SKIN CHANGES DUE TO CHRONIC EXPOSURE TO NONIONIZING RADIATION: ICD-10-CM

## 2024-07-12 DIAGNOSIS — D18.01 ANGIOMA OF SKIN: Primary | ICD-10-CM

## 2024-07-12 DIAGNOSIS — D22.9 BENIGN NEVUS: ICD-10-CM

## 2024-07-12 PROCEDURE — 99213 OFFICE O/P EST LOW 20 MIN: CPT | Performed by: NURSE PRACTITIONER

## 2024-07-12 PROCEDURE — 1160F RVW MEDS BY RX/DR IN RCRD: CPT | Performed by: NURSE PRACTITIONER

## 2024-07-12 PROCEDURE — 1159F MED LIST DOCD IN RCRD: CPT | Performed by: NURSE PRACTITIONER

## 2024-07-12 PROCEDURE — 1036F TOBACCO NON-USER: CPT | Performed by: NURSE PRACTITIONER

## 2024-07-12 RX ORDER — TRIAMCINOLONE ACETONIDE 1 MG/G
CREAM TOPICAL
Qty: 80 G | Refills: 0 | Status: SHIPPED | OUTPATIENT
Start: 2024-07-12

## 2024-07-12 NOTE — PROGRESS NOTES
Subjective     Nae Eli is a 74 y.o. female who presents for the following: Skin Check.     Review of Systems:  No other skin or systemic complaints other than what is documented elsewhere in the note.    The following portions of the chart were reviewed this encounter and updated as appropriate:   Tobacco  Allergies  Meds  Problems  Med Hx  Surg Hx         Skin Cancer History  No skin cancer on file.      Specialty Problems          Dermatology Problems    Actinic keratosis    Dermatitis, unspecified    Hemangioma of skin and subcutaneous tissue    Melanocytic nevi of trunk    Melanocytic nevi of unspecified lower limb, including hip    Melanocytic nevi of unspecified upper limb, including shoulder    Other benign neoplasm of skin, unspecified    Other hypertrophic disorders of the skin    Other melanin hyperpigmentation    Other seborrheic keratosis    Other specified follicular disorders    Xerosis cutis    Acne rosacea, papular type        Objective   Well appearing patient in no apparent distress; mood and affect are within normal limits.    A full examination was performed including scalp, head, eyes, ears, nose, lips, neck, chest, axillae, abdomen, back, buttocks, bilateral upper extremities, bilateral lower extremities, hands, feet, fingers, toes, fingernails, and toenails. All findings within normal limits unless otherwise noted below.      Assessment/Plan   1. Angioma of skin  Scattered cherry-red papule(s).    A cherry hemangioma is a small macule (small, flat, smooth area) or papule (small, solid bump) formed from an overgrowth of tiny blood vessels in the skin. Cherry hemangiomas are characteristically red or purplish in color. They often first appear in middle adulthood and usually increase in number with age. Cherry hemangiomas are noncancerous (benign) and are common in adults.    The present appearance of the lesion is not worrisome but it should continue to be observed and  testing/treatment may be warranted if change occurs.    2. Benign nevus  Scattered, uniform and benign-appearing, regular brown melanocytic papules and macules.    The present appearance of the lesion is not worrisome but it should continue to be observed and testing/treatment may be warranted if change occurs.    3. Seborrheic keratosis  Stuck on verrucous, tan-brown papules and plaques.      Seborrheic keratoses are common noncancerous (benign) growths of unknown cause seen in adults due to a thickening of an area of the top skin layer. Seborrheic keratoses may appear as if they are stuck on to the skin. They have distinct borders, and they may appear as papules (small, solid bumps) or plaques (solid, raised patches that are bigger than a thumbnail). They may be the same color as your skin, or they may be pink, light brown, darker brown, or very dark brown, or sometimes may appear black.    There is no way to prevent new seborrheic keratoses from forming. Seborrheic keratoses can be removed, but removal is considered a cosmetic issue and is usually not covered by insurance.    PLAN  No treatment is needed unless there is irritation from clothing, such as itching or bleeding.  2.   Some lotions containing alpha hydroxy acids, salicylic acid, or urea may make the areas feel smoother with regular use but will not eliminate them.    4. Lentigo  Scattered tan macules in sun-exposed areas.    A solar lentigo (plural, solar lentigines), also known as a sun-induced freckle or senile lentigo, is a dark (hyperpigmented) lesion caused by natural or artificial ultraviolet (UV) light. Solar lentigines may be single or multiple. This type of lentigo is different from a simple lentigo (lentigo simplex) because it is caused by exposure to UV light. Solar lentigines are benign, but they do indicate excessive sun exposure, a risk factor for the development of skin cancer.    To prevent solar lentigines, avoid exposure to sunlight  in midday (10 AM to 3 PM), wear sun-protective clothing (tightly woven clothes and hats), and apply sunscreen (SPF 30 UVA and UVB block).    The present appearance of the lesion is not worrisome but it should continue to be observed and testing/treatment may be warranted if change occurs.    5. Skin changes due to chronic exposure to nonionizing radiation  Actinic changes in the form of freckles, lentigines and hyper/hypopigmentation     ABCDEs of melanoma and atypical moles were discussed with the patient.    Patient was instructed to perform monthly self skin examination.  We recommended that the patient have regular full skin exams given an increased risk of subsequent skin cancers.    The patient was instructed to use sun protective behaviors including use of broad spectrum sunscreens and sun protective clothing to reduce risk of skin cancers.    Warning signs of non-melanoma skin cancer discussed.    6. Dermatitis  Nipples, Ears  Conchal bowls with pink skin scant scale noted. Right nipple area has some mild erythematous scaly patches and a single erythematous scaly macule to right areola    Hx of eczema dx in the ears previously. Also hx of radiation to bilateral breast. Patient reports HTC 2.5% cream does clear up the rash. She reports going 4-6 weeks between reoccurrence and need of using topical steroid. She does endorse skin does return to normal and is gone for a while. Continue with using topical steroids. May use TAC cream BID PRN.  Avoid using more than 14 days a month.     The following information regarding the use of topical steroids was provided: Local skin thinning,striae, and telangiectasia can occur with chronic application of this medication.  Long term use oftopical steroids should be avoided      7. Poison ivy dermatitis  Erythematous to pink scaly macules and patches on the arms and legs. <5% BSA    Poison ivy, poison oak, and poison sumac rashes (dermatitides) are all allergic reactions to  the oily resin called urushiol found on the leaves, stems, and roots of poison ivy, poison oak, and poison sumac plants. You cannot spread the rash from touching the lesions, even if the blisters pop; the rash is spread by contact with the plant resin itself. The rash first appears on the areas of skin exposed to the most oil, and then areas exposed to less oil begin to develop a rash later. Skin lesions usually begin to appear 48 hours after exposure if you have had previous contact with urushiol. If it is your first exposure, the reaction may take up to a few weeks to develop.     RECOMMENDATIONS  It is important to use water to wash all potentially exposed skin areas as soon as possible, as the resin of the poison ivy, poison oak, and poison sumac plants sticks to and can be absorbed into the skin within 30 minutes. Be sure to wash under the nails as well.  Once the resin has been washed off from the skin, there is no risk of spreading poison ivy, poison oak, or poison sumac from one part of the body to another. However, oil left on clothing and other objects, such as gloves, shoes, and camping gear, can last for months, so wash any clothes or other items potentially exposed to the oil as well.  Pets can also carry the oil on their coats and should be washed thoroughly if exposed to poison ivy, oak, or sumac.  Wear protective clothing (eg, pants, socks, and long-sleeved shirts) to avoid future reactions. If potentially coming in contact with poison ivy, oak, or sumac, wear protective gloves or apply a protective barrier (eg, IvyX Pre-Contact Poison Skin Solution).    PLAN  TAC BID PRN    The following information regarding the use of topical steroids was provided: Local skin thinning,striae, and telangiectasia can occur with chronic application of this medication.  Long term use oftopical steroids should be avoided      Related Medications  triamcinolone (Kenalog) 0.1 % cream  Apply to affected areas twice daily  for 2 weeks then daily for 1 week then every other day for 1 week then stop. Then may used as needed when active. When using for maintenance, use less than 14 days per month.        Return to clinic in 1 year for skin check/follow up or sooner if needed

## 2024-07-12 NOTE — PATIENT INSTRUCTIONS

## 2024-07-18 ENCOUNTER — APPOINTMENT (OUTPATIENT)
Dept: PRIMARY CARE | Facility: CLINIC | Age: 74
End: 2024-07-18
Payer: MEDICARE

## 2024-07-18 VITALS
HEART RATE: 86 BPM | HEIGHT: 64 IN | BODY MASS INDEX: 29.53 KG/M2 | RESPIRATION RATE: 16 BRPM | DIASTOLIC BLOOD PRESSURE: 70 MMHG | SYSTOLIC BLOOD PRESSURE: 120 MMHG | OXYGEN SATURATION: 95 % | WEIGHT: 173 LBS

## 2024-07-18 DIAGNOSIS — Z17.0 MALIGNANT NEOPLASM OF CENTRAL PORTION OF BOTH BREASTS IN FEMALE, ESTROGEN RECEPTOR POSITIVE (MULTI): ICD-10-CM

## 2024-07-18 DIAGNOSIS — C50.111 MALIGNANT NEOPLASM OF CENTRAL PORTION OF BOTH BREASTS IN FEMALE, ESTROGEN RECEPTOR POSITIVE (MULTI): ICD-10-CM

## 2024-07-18 DIAGNOSIS — C50.112 MALIGNANT NEOPLASM OF CENTRAL PORTION OF BOTH BREASTS IN FEMALE, ESTROGEN RECEPTOR POSITIVE (MULTI): ICD-10-CM

## 2024-07-18 DIAGNOSIS — I10 BENIGN ESSENTIAL HYPERTENSION: ICD-10-CM

## 2024-07-18 DIAGNOSIS — Z00.00 MEDICARE ANNUAL WELLNESS VISIT, SUBSEQUENT: ICD-10-CM

## 2024-07-18 DIAGNOSIS — Z00.00 ROUTINE GENERAL MEDICAL EXAMINATION AT HEALTH CARE FACILITY: Primary | ICD-10-CM

## 2024-07-18 DIAGNOSIS — E78.00 HYPERCHOLESTEROLEMIA: ICD-10-CM

## 2024-07-18 DIAGNOSIS — R53.83 OTHER FATIGUE: ICD-10-CM

## 2024-07-18 PROBLEM — H60.509 ACUTE OTITIS EXTERNA: Status: ACTIVE | Noted: 2024-07-18

## 2024-07-18 PROBLEM — J90 PLEURISY WITH PLEURAL EFFUSION: Status: ACTIVE | Noted: 2024-07-18

## 2024-07-18 PROBLEM — N63.0 MASS OF BREAST: Status: ACTIVE | Noted: 2024-07-18

## 2024-07-18 PROBLEM — R09.89 PULMONARY CONGESTION: Status: ACTIVE | Noted: 2024-07-18

## 2024-07-18 PROBLEM — Z85.3 HISTORY OF MALIGNANT NEOPLASM OF BREAST: Status: ACTIVE | Noted: 2024-07-18

## 2024-07-18 PROCEDURE — G0439 PPPS, SUBSEQ VISIT: HCPCS | Performed by: INTERNAL MEDICINE

## 2024-07-18 PROCEDURE — 99214 OFFICE O/P EST MOD 30 MIN: CPT | Performed by: INTERNAL MEDICINE

## 2024-07-18 PROCEDURE — 3074F SYST BP LT 130 MM HG: CPT | Performed by: INTERNAL MEDICINE

## 2024-07-18 PROCEDURE — 1170F FXNL STATUS ASSESSED: CPT | Performed by: INTERNAL MEDICINE

## 2024-07-18 PROCEDURE — 1160F RVW MEDS BY RX/DR IN RCRD: CPT | Performed by: INTERNAL MEDICINE

## 2024-07-18 PROCEDURE — 3078F DIAST BP <80 MM HG: CPT | Performed by: INTERNAL MEDICINE

## 2024-07-18 PROCEDURE — 1036F TOBACCO NON-USER: CPT | Performed by: INTERNAL MEDICINE

## 2024-07-18 PROCEDURE — 1159F MED LIST DOCD IN RCRD: CPT | Performed by: INTERNAL MEDICINE

## 2024-07-18 PROCEDURE — 3008F BODY MASS INDEX DOCD: CPT | Performed by: INTERNAL MEDICINE

## 2024-07-18 ASSESSMENT — PATIENT HEALTH QUESTIONNAIRE - PHQ9
SUM OF ALL RESPONSES TO PHQ9 QUESTIONS 1 AND 2: 0
2. FEELING DOWN, DEPRESSED OR HOPELESS: NOT AT ALL
1. LITTLE INTEREST OR PLEASURE IN DOING THINGS: NOT AT ALL

## 2024-07-18 ASSESSMENT — ENCOUNTER SYMPTOMS
HEMATOLOGIC/LYMPHATIC NEGATIVE: 1
OCCASIONAL FEELINGS OF UNSTEADINESS: 0
DEPRESSION: 0
GASTROINTESTINAL NEGATIVE: 1
LOSS OF SENSATION IN FEET: 0
ENDOCRINE NEGATIVE: 1
RESPIRATORY NEGATIVE: 1
PSYCHIATRIC NEGATIVE: 1
CONSTITUTIONAL NEGATIVE: 1
NEUROLOGICAL NEGATIVE: 1
CARDIOVASCULAR NEGATIVE: 1
EYES NEGATIVE: 1
ALLERGIC/IMMUNOLOGIC NEGATIVE: 1
MUSCULOSKELETAL NEGATIVE: 1

## 2024-07-18 ASSESSMENT — ACTIVITIES OF DAILY LIVING (ADL)
GROCERY_SHOPPING: INDEPENDENT
DRESSING: INDEPENDENT
MANAGING_FINANCES: INDEPENDENT
BATHING: INDEPENDENT
DOING_HOUSEWORK: INDEPENDENT
TAKING_MEDICATION: INDEPENDENT

## 2024-07-18 NOTE — ASSESSMENT & PLAN NOTE
No recent hospitalizations.    All medications reviewed and reconciled by me the provider..  No use of controlled substances or opiates.    Family history, social history reviewed, no changes.    Patient does not smoke.    Patient does not drink.    Patient hydrates adequately daily.  Eats a well-balanced healthy diet.     Exercises adequately including walking and doing weightbearing exercises.    Patient denies any difficulty with memory or cognition.     Denies any difficulty with hearing.  Patient does not wear hearing aids.    No fall risk.  No recent falls.  Denies any difficulty walking.    Patient with no history of depression anxiety, denies any loss of interest, no feeling of sadness, no lack of motivation.    Patient is independent in all ADLs and IADLs.  Independent bathing, dressing, walking.  Takes care of own finances, shopping and cooking.     End-of-life decision-making power of  reviewed with patient.     Risk Factors Identified During Visit: None.   Influenza: influenza vaccine was previously given.   Pneumovax 23: Pneumovax 23 vaccine was previously given.   Prevnar 13: Prevnar 13 vaccine was previously given.   Shingles Vaccine: Shingles vaccine was previously given.   Colorectal Cancer Screening: screening is current.   Abdominal Aortic Aneurysm screening: screening is current.   HIV screening: screening not indicated.       Preventive measures - Recommend ASAP : PAP/Mamogram and refer patient to GYN. Specialist. Last Colonoscopy was normal in 2015  (educate patient risks of colon cancer) refer patient to GI specialist. Ophthalmology and retina exam recommend yearly exams refer patient to an Ophthalmologist.

## 2024-07-18 NOTE — PROGRESS NOTES
"Subjective   Patient ID: Nae Eli is a 74 y.o. female who presents for Medicare Annual Wellness Visit Subsequent (mcr).    HPI     Review of Systems   Constitutional: Negative.    HENT: Negative.     Eyes: Negative.    Respiratory: Negative.     Cardiovascular: Negative.    Gastrointestinal: Negative.    Endocrine: Negative.    Musculoskeletal: Negative.    Skin: Negative.    Allergic/Immunologic: Negative.    Neurological: Negative.    Hematological: Negative.    Psychiatric/Behavioral: Negative.     All other systems reviewed and are negative.      Objective   Pulse 86   Resp 16   Ht 1.626 m (5' 4\")   Wt 78.5 kg (173 lb)   SpO2 95%   BMI 29.70 kg/m²   Blood pressure 120/70, pulse 86, resp. rate 16, height 1.626 m (5' 4\"), weight 78.5 kg (173 lb), SpO2 95%.   Physical Exam  Vitals and nursing note reviewed.   Constitutional:       Appearance: Normal appearance.   HENT:      Head: Normocephalic and atraumatic.      Right Ear: Tympanic membrane, ear canal and external ear normal.      Left Ear: Tympanic membrane, ear canal and external ear normal. There is no impacted cerumen.      Nose: Nose normal.      Mouth/Throat:      Mouth: Mucous membranes are moist.      Pharynx: Oropharynx is clear.   Eyes:      Extraocular Movements: Extraocular movements intact.      Conjunctiva/sclera: Conjunctivae normal.      Pupils: Pupils are equal, round, and reactive to light.   Cardiovascular:      Rate and Rhythm: Normal rate and regular rhythm.      Pulses: Normal pulses.      Heart sounds: Normal heart sounds. No murmur heard.  Pulmonary:      Effort: Pulmonary effort is normal. No respiratory distress.      Breath sounds: Normal breath sounds. No stridor. No wheezing, rhonchi or rales.   Chest:      Chest wall: No tenderness.   Abdominal:      General: Abdomen is flat. Bowel sounds are normal. There is no distension.      Palpations: Abdomen is soft. There is no mass.      Tenderness: There is no abdominal " tenderness. There is no right CVA tenderness, left CVA tenderness, guarding or rebound.      Hernia: No hernia is present.   Musculoskeletal:         General: Normal range of motion.      Cervical back: Normal range of motion and neck supple.   Skin:     General: Skin is warm.      Capillary Refill: Capillary refill takes less than 2 seconds.   Neurological:      General: No focal deficit present.      Mental Status: She is alert.      Cranial Nerves: No cranial nerve deficit.      Sensory: No sensory deficit.      Motor: No weakness.      Coordination: Coordination normal.      Gait: Gait normal.      Deep Tendon Reflexes: Reflexes normal.   Psychiatric:         Mood and Affect: Mood normal.         Behavior: Behavior normal. Behavior is cooperative.         Thought Content: Thought content normal.         Judgment: Judgment normal.         Assessment/Plan   Problem List Items Addressed This Visit             ICD-10-CM    Benign essential hypertension I10     HTN - hypertension not well/controlled and fluctuating .Target BP < 130/80  achieved. Educate low salt diet and exercise with weight loss. Educate home self monitoring and diary keeping. Educate risks of elevate blood pressure and benefits of prompt treatment.  Refill Lisinopril and hydrochlorothiazide   start metoprolol 25 mg daily to avoid fluctuations and control vertigo          Bilateral breast cancer (Multi) C50.911, C50.912     Follows with oncology and continues the Anastrozole          Hypercholesterolemia E78.00     Hypercholesterolemia - Monitor lipid profile and educate patient upon risks of high cholesterol and targets. Educate diet and change in lifestyle and increase in exercises - Refill:  Rosuvastatin  and educate compliance with medication and diet.            Medicare annual wellness visit, subsequent - Primary Z00.00     No recent hospitalizations.    All medications reviewed and reconciled by me the provider..  No use of controlled  substances or opiates.    Family history, social history reviewed, no changes.    Patient does not smoke.    Patient does not drink.    Patient hydrates adequately daily.  Eats a well-balanced healthy diet.     Exercises adequately including walking and doing weightbearing exercises.    Patient denies any difficulty with memory or cognition.     Denies any difficulty with hearing.  Patient does not wear hearing aids.    No fall risk.  No recent falls.  Denies any difficulty walking.    Patient with no history of depression anxiety, denies any loss of interest, no feeling of sadness, no lack of motivation.    Patient is independent in all ADLs and IADLs.  Independent bathing, dressing, walking.  Takes care of own finances, shopping and cooking.     End-of-life decision-making power of  reviewed with patient.     Risk Factors Identified During Visit: None.   Influenza: influenza vaccine was previously given.   Pneumovax 23: Pneumovax 23 vaccine was previously given.   Prevnar 13: Prevnar 13 vaccine was previously given.   Shingles Vaccine: Shingles vaccine was previously given.   Colorectal Cancer Screening: screening is current.   Abdominal Aortic Aneurysm screening: screening is current.   HIV screening: screening not indicated.       Preventive measures - Recommend ASAP : PAP/Mamogram and refer patient to GYN. Specialist. Last Colonoscopy was normal in 2015  (educate patient risks of colon cancer) refer patient to GI specialist. Ophthalmology and retina exam recommend yearly exams refer patient to an Ophthalmologist.                Benign essential hypertension - Primary I10        HTN - hypertension not well/controlled and fluctuating .Target BP < 130/80  achieved. Educate low salt diet and exercise with weight loss. Educate home self monitoring and diary keeping. Educate risks of elevate blood pressure and benefits of prompt treatment.  Refill Lisinopril and hydrochlorothiazide   start metoprolol 25 mg  daily to avoid fluctuations and control vertigo            Relevant Medications     metoprolol succinate XL (Toprol-XL) 25 mg 24 hr tablet     Elevated fasting glucose R73.01       Hyperglycemia  - Educate extensively low carbohydrate diet AND LOW FAT DIET AND increase in exercise activity  and weight loss program and monitor HbA1C and glucose levels associated with positive family history of diabetes on her father side            Hypercholesterolemia E78.00       Hypercholesterolemia - Monitor lipid profile and educate patient upon risks of high cholesterol and targets. Educate diet and change in lifestyle and increase in exercises - Refill:  Rosuvastatin  and educate compliance with medication and diet.              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 otitis vs. Eustachian tube dysfunction vs. advanced cervical disc disease causing encroachment of the Vertebral arteries vs. viral infection of the Vestibular nerve sensor - recommend : Antivert /Meclizine 25 mg TID , treat infection of the sinuses or ears , increase intake of fluids, educate exercises , avoid operating machinery, monitor closely, rest.                         Benign essential hypertension - Primary I10         HTN - hypertension not well/controlled .Target BP < 130/80  achieved. Educate low salt diet and exercise with weight loss. Educate home self monitoring and diary keeping. Educate risks of elevate blood pressure and benefits of prompt treatment.  Refill Lisinopril and hydrochlorothiazide add Metoprolol 25 mg daily               Relevant Medications     lisinopriL-hydrochlorothiazide 10-12.5 mg tablet     Bilateral breast cancer (CMS/[facility]) C50.911, C50.912       Follows with oncology and continues the Anastrozole            Hypercholesterolemia E78.00       Hypercholesterolemia - Monitor lipid profile and educate patient upon risks of high cholesterol  and targets. Educate diet and change in lifestyle and increase in exercises - Refill:  Rosuvastatin  and educate compliance with medication and diet.             Relevant Medications     rosuvastatin (Crestor) 10 mg tablet     Low vitamin D level R79.89       Check vitamin D level and supplement              Z00.00                                 Immunizations/Injections       very important  Immunizations from outside sources need reconciliation.       Influenza, Unspecified9/14/2009  Influenza, seasonal, injectable9/30/2016  Moderna SARS-CoV-2 Vaccination7/21/2022, 11/28/2021, 3/5/2021,  ... (1 more)  Pneumococcal conjugate vaccine, 13-valent (PREVNAR 13)4/7/2017  Pneumococcal polysaccharide vaccine, 23-valent, age 2 years and older (PNEUMOVAX 23)4/15/2022  Tdap vaccine, age 7 year and older (BOOSTRIX, ADACEL)7/16/2015, 11/30/2000Immunizations/Injections       very important  Immunizations from outside sources need reconciliation.       Influenza, Unspecified9/14/2009  Influenza, seasonal, injectable9/30/2016  Moderna SARS-CoV-2 Vaccination7/21/2022, 11/28/2021, 3/5/2021,  ... (1 more)  Pneumococcal conjugate vaccine, 13-valent (PREVNAR 13)4/7/2017  Pneumococcal polysaccharide vaccine, 23-valent, age 2 years and older (PNEUMOVAX 23)4/15/2022  Tdap vaccine, age 7 year and older (BOOSTRIX, ADACEL)7/16/2015, 11/30/2000

## 2024-08-23 ENCOUNTER — APPOINTMENT (OUTPATIENT)
Dept: HEMATOLOGY/ONCOLOGY | Facility: CLINIC | Age: 74
End: 2024-08-23
Payer: MEDICARE

## 2024-08-28 DIAGNOSIS — I10 BENIGN ESSENTIAL HYPERTENSION: ICD-10-CM

## 2024-08-28 RX ORDER — METOPROLOL SUCCINATE 25 MG/1
TABLET, EXTENDED RELEASE ORAL
Qty: 30 TABLET | Refills: 5 | Status: SHIPPED | OUTPATIENT
Start: 2024-08-28

## 2024-08-30 ENCOUNTER — OFFICE VISIT (OUTPATIENT)
Dept: HEMATOLOGY/ONCOLOGY | Facility: CLINIC | Age: 74
End: 2024-08-30
Payer: MEDICARE

## 2024-08-30 VITALS
SYSTOLIC BLOOD PRESSURE: 163 MMHG | BODY MASS INDEX: 30.39 KG/M2 | HEART RATE: 72 BPM | WEIGHT: 177.03 LBS | DIASTOLIC BLOOD PRESSURE: 92 MMHG

## 2024-08-30 DIAGNOSIS — Z17.0 MALIGNANT NEOPLASM OF UPPER-OUTER QUADRANT OF LEFT BREAST IN FEMALE, ESTROGEN RECEPTOR POSITIVE (MULTI): ICD-10-CM

## 2024-08-30 DIAGNOSIS — Z17.0 MALIGNANT NEOPLASM OF LOWER-INNER QUADRANT OF RIGHT BREAST OF FEMALE, ESTROGEN RECEPTOR POSITIVE (MULTI): ICD-10-CM

## 2024-08-30 DIAGNOSIS — Z85.3 HISTORY OF RIGHT BREAST CANCER: ICD-10-CM

## 2024-08-30 DIAGNOSIS — Z79.811 ENCOUNTER FOR MONITORING AROMATASE INHIBITOR THERAPY: ICD-10-CM

## 2024-08-30 DIAGNOSIS — C50.412 MALIGNANT NEOPLASM OF UPPER-OUTER QUADRANT OF LEFT BREAST IN FEMALE, ESTROGEN RECEPTOR POSITIVE (MULTI): ICD-10-CM

## 2024-08-30 DIAGNOSIS — Z12.31 BREAST CANCER SCREENING BY MAMMOGRAM: ICD-10-CM

## 2024-08-30 DIAGNOSIS — C50.311 MALIGNANT NEOPLASM OF LOWER-INNER QUADRANT OF RIGHT BREAST OF FEMALE, ESTROGEN RECEPTOR POSITIVE (MULTI): ICD-10-CM

## 2024-08-30 DIAGNOSIS — Z51.81 ENCOUNTER FOR MONITORING AROMATASE INHIBITOR THERAPY: ICD-10-CM

## 2024-08-30 PROCEDURE — 1159F MED LIST DOCD IN RCRD: CPT | Performed by: NURSE PRACTITIONER

## 2024-08-30 PROCEDURE — 3080F DIAST BP >= 90 MM HG: CPT | Performed by: NURSE PRACTITIONER

## 2024-08-30 PROCEDURE — 1126F AMNT PAIN NOTED NONE PRSNT: CPT | Performed by: NURSE PRACTITIONER

## 2024-08-30 PROCEDURE — 1160F RVW MEDS BY RX/DR IN RCRD: CPT | Performed by: NURSE PRACTITIONER

## 2024-08-30 PROCEDURE — 99214 OFFICE O/P EST MOD 30 MIN: CPT | Performed by: NURSE PRACTITIONER

## 2024-08-30 PROCEDURE — 3077F SYST BP >= 140 MM HG: CPT | Performed by: NURSE PRACTITIONER

## 2024-08-30 RX ORDER — ANASTROZOLE 1 MG/1
1 TABLET ORAL DAILY
Qty: 90 TABLET | Refills: 3 | Status: SHIPPED | OUTPATIENT
Start: 2024-08-30

## 2024-08-30 ASSESSMENT — PATIENT HEALTH QUESTIONNAIRE - PHQ9
1. LITTLE INTEREST OR PLEASURE IN DOING THINGS: NOT AT ALL
2. FEELING DOWN, DEPRESSED OR HOPELESS: NOT AT ALL
SUM OF ALL RESPONSES TO PHQ9 QUESTIONS 1 & 2: 0

## 2024-08-30 ASSESSMENT — PAIN SCALES - GENERAL: PAINLEVEL: 0-NO PAIN

## 2024-09-04 NOTE — PROGRESS NOTES
Oncology Follow-Up    Nae Eli  [id number]           Breast         AJCC Edition: 8th (AJCC), Diagnosis Date: Sep 2021, IA, pT1b pN0 cM0 G2           AJCC Edition: 8th (AJCC), Diagnosis Date: Sep 2021, IA, pT1c pN0 cM0 G2     Nae Eli is a very pleasant 72 year old  female with a history of bilateral breast cancer diagnosed in August 2021.   1. She underwent bilateral breast lumpectomies and bilateral sentinel lymph node biopsies in September 2021.   2. Right side: 2 cm invasive ductal carcinoma, grade 2, ER+>95%, SC+95%, HER2 negative, 0/3 sentinel lymph nodes involved, T1cN0, Stage IA. Mammaprint  low risk luminal A +0.243.   3. Left side: 0.9 cm invasive ductal carcinoma, grade 2, ER+>95%, SC+95%, HER2 negative, 0/1 sentinel lymph nodes involved, T1bN0, Stage IA.  3. Mammaprint low risk luminal A +0.015.  4. She completed radiation to both sides in February 2022.  5. She initiated endocrine therapy with Anastrozole February 2022 and continues on anastrozole.      Subjective    Maci feels well. She is doing monthly Breast self exams. She will be seeing dermatology soon. Maci rates her energy level as 10/10 and reports no distress. She continues on anastrozole with good tolerance. She sees Dr. Alonso in primary care. Maci denies any unusual headaches, balance issues, depression, cough, shortness of breath, problems swallowing, changes in chest/breast area, abdominal pain, bone or muscle pain, vaginal bleeding, rectal bleeding, blood in the urine, vaginal dryness, swelling arms or legs, new or unusual skin moles or lesions.         Objective      Vitals:    08/30/24 1111   BP: (!) 163/92   Pulse: 72        Constitutional: Well developed, alert/oriented x3, no distress, cooperative   Eyes: clear sclera   ENMT: mucous membranes moist, no apparent lesions   Head/Neck: Neck supple, no bruits   Respiratory/Thorax: Patent airways, normal breath sounds with good chest expansion   Cardiovascular:  Regular rate and rhythm, no murmurs, 2+ equal pulses of the extremities,   Gastrointestinal: Nondistended, soft, non-tender, no masses palpable, no organomegaly   Musculoskeletal: ROM intact, no joint swelling, normal strength   Extremities: normal extremities, no edema, cyanosis, contusions or wounds   Neurological: alert and oriented x3,  normal strength   Breast:     Lymphatic: No significant lymphadenopathy   Psychological: Appropriate mood and behavior   Skin: Warm and dry, no lesions, no rashes      Physical Exam  Chest:          Comments: Right breast + for breast conserving surgery with well healed central/inferior and right axillary incisions; no masses, nodules, skin changes, discharge. Left breast + for breast conserving surgery with well healed UOQ and left axillary incisions; no masses, nodules, skin changes, discharge         Lab Results   Component Value Date    WBC 7.8 09/30/2022    HGB 12.9 09/30/2022    HCT 39.6 09/30/2022     09/30/2022     09/30/2022       Chemistry    Lab Results   Component Value Date/Time     09/30/2022 0959    K 4.0 09/30/2022 0959     09/30/2022 0959    CO2 27 09/30/2022 0959    BUN 19 09/30/2022 0959    CREATININE 0.93 09/30/2022 0959    Lab Results   Component Value Date/Time    CALCIUM 9.5 09/30/2022 0959    ALKPHOS 57 09/30/2022 0959    AST 16 09/30/2022 0959    ALT 14 09/30/2022 0959    BILITOT 0.4 09/30/2022 0959           Imaging:    Signed Time Phone Pager   Alonso Ventura MD 4/19/2024 11:20 [phone]     Exam Information    Status Exam Begun Exam Ended   Final 4/19/2024 10:17 4/19/2024 10:30     Study Result    Narrative & Impression   Interpreted By:  Alonso Ventura,   STUDY:  BI MAMMO BILATERAL SCREENING TOMOSYNTHESIS; 4/19/2024 10:30 am      ACCESSION NUMBER(S):  [id number]      ORDERING CLINICIAN:  CLINTON MARINO      INDICATION:  Screening. History of bilateral lumpectomies and radiation      COMPARISON:  None.       FINDINGS:  2D and tomosynthesis images were reviewed at 1 mm slice thickness.      Density:  There are areas of scattered fibroglandular tissue.      Expected postsurgical and radiation therapy changes are noted in both  breast. No new suspicious masses or calcifications are identified.      IMPRESSION:  No mammographic evidence of malignancy.      BI-RADS CATEGORY:  BI-RADS Category:  2 Benign.  Recommendation:  Annual Screening.  Recommended Date:  1 Year.  Laterality:  Bilateral.     Status Exam Begun Exam Ended   Final 5/17/2024 10:00 5/17/2024 10:14     Study Result    Narrative & Impression   Interpreted By:  Johnathon Champagne,   STUDY:  DEXA BONE DENSITY5/17/2024 10:14 am      INDICATION:  Signs/Symptoms:hx breast cancer, osteopenia, on anastrozole. The  patient is a 73 y/o  year old F.      COMPARISON:  DEXA scan dated 11/05/2021      ACCESSION NUMBER(S):  [id number]      ORDERING CLINICIAN:  CLINTON MARINO      TECHNIQUE:  DEXA BONE DENSITY      FINDINGS:  SPINE L1-L4  Bone Mineral Density: 1.307  T-Score 2.4  Z-Score 4.6  Bone Mineral Density change vs baseline:  3.2  Bone Mineral Density change vs previous: -0.1      LEFT FEMUR -TOTAL  Bone Mineral Density: 1.069  T-Score 1   Z-Score  2.7  Bone Mineral Density change vs baseline: -5.6  Bone Mineral Density change vs previous: -3.2      LEFT FEMUR -NECK  Bone Mineral Density: 0.737  T-Score -1  Z-Score 1          World Health Organization (WHO) criteria for post-menopausal,   Women:  Normal:         T-score at or above -1 SD  Osteopenia:   T-score between -1 and -2.5 SD  Osteoporosis: T-score at or below -2.5 SD          10-year Fracture Risk:  Major Osteoporotic Fracture  Not reported  Hip Fracture                        Not reported      Note:  If no FRAX score is reported, it is because:  Some T-score for Spine Total or Hip Total or Femoral Neck at or below  -2.5      This exam was performed at [facility] on a [facility]  Dexa Unit.      IMPRESSION:  DEXA:  According to World Health Organization criteria,  classification is normal.     Assessment/Plan    Maci is a 75 yo woman with a hx of bilateral breast cancer diagnosed Septemeber 2021. She is s/p bilateral partial mastectomy, SLNB, XRT, and is currently on anastrozole with good tolerance. There is no evidence of recurrent disease on today's exam.     Plan:  Exam is negative.  Continue anastrozole 1mg daily.  Encouraged monthly breast self exams, plant based diet, keep alcohol <3 drinks/week, exercise at least 2.5 hours/week.  We reviewed signs/symptoms of recurrence including new masses, new pigmented lesion, tugging or pulling of the skin, nipple discharge, rash in or around the chest area, or any new finding that doesn't resolve within a 2-3 weeks.  All of Maci's questions/concerns were addressed.  Over 25 minutes of time was spent with this patient with >50% of the time with education, counseling, and coordination of care.   I will see Maci back in April with her mammogram. She will call with any concerns in the interim.         Diagnoses and all orders for this visit:  Malignant neoplasm of lower-inner quadrant of right breast of female, estrogen receptor positive (Multi)  -     Clinic Appointment Request Follow Up; CLINTON MARINO  -     Clinic Appointment Request Follow Up; CLINTON MARINO; Future  -     BI mammo bilateral screening tomosynthesis; Future  Malignant neoplasm of upper-outer quadrant of left breast in female, estrogen receptor positive (Multi)  -     Clinic Appointment Request Follow Up; CLINTON MARINO  Encounter for monitoring aromatase inhibitor therapy  -     Clinic Appointment Request Follow Up; CLINTON MARINO  -     anastrozole (Arimidex) 1 mg tablet; Take 1 tablet (1 mg total) by mouth once daily.  Swallow whole with a drink of water.  Breast cancer screening by mammogram  -     BI mammo bilateral screening tomosynthesis; Future  History of right breast  cancer  -     Clinic Appointment Request Follow up        Erin Broussard, APRN-CNP

## 2024-09-04 NOTE — PATIENT INSTRUCTIONS
1. Exercise 2.5 hours per week; bone strengthening, cardio-vascular, resistance training.  2. Please do self breast exams monthly.  3. Keep alcohol under 3 drinks per week.  4. Sun safety - limit sun exposure from 11a-2p when its at its hottest, apply 15-30 sun block and re-apply every 1-2 hours if perspiring or swimming.  5. Eat a plant based diet, add in oily fishes such as mackerel, tuna, and salmon.  6. Get in at least 1,000 mg of calcium per day through diet or supplement for bone strength. Examples of foods higher in calcium are milk, yogurt, fruited yogurt, oranges, fortified orange juice, almonds, almond milk, broccoli, spinach, bok jeanne, mustard greens, puddings, custards, ice cream, fortified cereals, bars, and crackers.   7. Continue anastrozole 1mg daily.  8. Please call the office if any new mass or rash in or around breast, or any uncontrolled symptoms that last over 2-3 weeks at [phone].  9. Your exam is negative!  10. It was nice seeing you today, Maci. I will see you back in late April with your mammogram. Please call if you have any concerns.  Thank you for choosing [facility] for your care.

## 2024-09-13 ENCOUNTER — APPOINTMENT (OUTPATIENT)
Dept: HEMATOLOGY/ONCOLOGY | Facility: CLINIC | Age: 74
End: 2024-09-13
Payer: MEDICARE

## 2024-10-30 DIAGNOSIS — E78.00 HYPERCHOLESTEROLEMIA: ICD-10-CM

## 2024-10-30 RX ORDER — ROSUVASTATIN CALCIUM 10 MG/1
10 TABLET, COATED ORAL NIGHTLY
Qty: 90 TABLET | Refills: 1 | Status: SHIPPED | OUTPATIENT
Start: 2024-10-30

## 2024-11-21 ENCOUNTER — APPOINTMENT (OUTPATIENT)
Dept: PRIMARY CARE | Facility: CLINIC | Age: 74
End: 2024-11-21
Payer: MEDICARE

## 2024-11-29 DIAGNOSIS — I10 BENIGN ESSENTIAL HYPERTENSION: ICD-10-CM

## 2024-12-02 RX ORDER — LISINOPRIL AND HYDROCHLOROTHIAZIDE 10; 12.5 MG/1; MG/1
1 TABLET ORAL DAILY
Qty: 30 TABLET | Refills: 0 | Status: SHIPPED | OUTPATIENT
Start: 2024-12-02 | End: 2025-05-31

## 2024-12-03 ASSESSMENT — DERMATOLOGY QUALITY OF LIFE (QOL) ASSESSMENT
RATE HOW BOTHERED YOU ARE BY EFFECTS OF YOUR SKIN PROBLEMS ON YOUR ACTIVITIES (EG, GOING OUT, ACCOMPLISHING WHAT YOU WANT, WORK ACTIVITIES OR YOUR RELATIONSHIPS WITH OTHERS): 0 - NEVER BOTHERED
WHAT SINGLE SKIN CONDITION LISTED BELOW IS THE PATIENT ANSWERING THE QUALITY-OF-LIFE ASSESSMENT QUESTIONS ABOUT: DERMATITIS
RATE HOW BOTHERED YOU ARE BY EFFECTS OF YOUR SKIN PROBLEMS ON YOUR ACTIVITIES (EG, GOING OUT, ACCOMPLISHING WHAT YOU WANT, WORK ACTIVITIES OR YOUR RELATIONSHIPS WITH OTHERS): 0 - NEVER BOTHERED
WHAT SINGLE SKIN CONDITION LISTED BELOW IS THE PATIENT ANSWERING THE QUALITY-OF-LIFE ASSESSMENT QUESTIONS ABOUT: DERMATITIS
RATE HOW BOTHERED YOU ARE BY SYMPTOMS OF YOUR SKIN PROBLEM (EG, ITCHING, STINGING BURNING, HURTING OR SKIN IRRITATION): 2
RATE HOW EMOTIONALLY BOTHERED YOU ARE BY YOUR SKIN PROBLEM (FOR EXAMPLE, WORRY, EMBARRASSMENT, FRUSTRATION): 2
RATE HOW BOTHERED YOU ARE BY SYMPTOMS OF YOUR SKIN PROBLEM (EG, ITCHING, STINGING BURNING, HURTING OR SKIN IRRITATION): 2
RATE HOW EMOTIONALLY BOTHERED YOU ARE BY YOUR SKIN PROBLEM (FOR EXAMPLE, WORRY, EMBARRASSMENT, FRUSTRATION): 2

## 2024-12-04 ENCOUNTER — OFFICE VISIT (OUTPATIENT)
Dept: DERMATOLOGY | Facility: CLINIC | Age: 74
End: 2024-12-04
Payer: MEDICARE

## 2024-12-04 DIAGNOSIS — B35.4 TINEA CORPORIS: ICD-10-CM

## 2024-12-04 DIAGNOSIS — L82.1 SEBORRHEIC KERATOSIS: Primary | ICD-10-CM

## 2024-12-04 PROCEDURE — 1159F MED LIST DOCD IN RCRD: CPT | Performed by: NURSE PRACTITIONER

## 2024-12-04 PROCEDURE — 1160F RVW MEDS BY RX/DR IN RCRD: CPT | Performed by: NURSE PRACTITIONER

## 2024-12-04 PROCEDURE — 99213 OFFICE O/P EST LOW 20 MIN: CPT | Performed by: NURSE PRACTITIONER

## 2024-12-04 PROCEDURE — 1036F TOBACCO NON-USER: CPT | Performed by: NURSE PRACTITIONER

## 2024-12-04 RX ORDER — KETOCONAZOLE 20 MG/G
CREAM TOPICAL
Qty: 60 G | Refills: 1 | Status: SHIPPED | OUTPATIENT
Start: 2024-12-04

## 2024-12-04 NOTE — PROGRESS NOTES
Subjective     Nae Eli is a 74 y.o. female who presents for the following: Suspicious Skin Lesion.     Review of Systems:  No other skin or systemic complaints other than what is documented elsewhere in the note.    The following portions of the chart were reviewed this encounter and updated as appropriate:   Tobacco  Allergies  Meds  Problems  Med Hx  Surg Hx         Skin Cancer History  No skin cancer on file.      Specialty Problems          Dermatology Problems    Actinic keratosis    Dermatitis, unspecified    Hemangioma of skin and subcutaneous tissue    Melanocytic nevi of trunk    Melanocytic nevi of unspecified lower limb, including hip    Melanocytic nevi of unspecified upper limb, including shoulder    Other benign neoplasm of skin, unspecified    Other hypertrophic disorders of the skin    Other melanin hyperpigmentation    Other seborrheic keratosis    Other specified follicular disorders    Xerosis cutis    Acne rosacea, papular type        Objective   Well appearing patient in no apparent distress; mood and affect are within normal limits.    A focused skin examination was performed. All findings within normal limits unless otherwise noted below.    Assessment/Plan   1. Seborrheic keratosis (3)  Head - Anterior (Face), Left Popliteal Fossa, Left Shoulder - Anterior  Stuck on verrucous, tan-brown papules and plaques.      Seborrheic keratoses are common noncancerous (benign) growths of unknown cause seen in adults due to a thickening of an area of the top skin layer. Seborrheic keratoses may appear as if they are stuck on to the skin. They have distinct borders, and they may appear as papules (small, solid bumps) or plaques (solid, raised patches that are bigger than a thumbnail). They may be the same color as your skin, or they may be pink, light brown, darker brown, or very dark brown, or sometimes may appear black.    There is no way to prevent new seborrheic keratoses from forming.  Seborrheic keratoses can be removed, but removal is considered a cosmetic issue and is usually not covered by insurance.    PLAN  No treatment is needed unless there is irritation from clothing, such as itching or bleeding.  2.   Some lotions containing alpha hydroxy acids, salicylic acid, or urea may make the areas feel smoother with regular use but will not eliminate them.    2. Tinea corporis  Left Knee - Anterior  Red to pink scaly annular plaque with raised edges with scale and more central clearing    Classic for tinea infection, also noted on the right foot and patient also noted to have onychomycosis.     Tinea infections are commonly called ringworm because some may form a ring-like pattern on affected areas of the body. Tinea corporis, also known as ringworm of the body, tinea circinata, or simply ringworm, is a fungal infection on the surface of the skin. Ringworm may be passed to humans from direct contact with infected people, infected animals, contaminated objects (such as towels or locker room floors), and the soil.      Plan  Ketoconazole cream BID for 4 weeks  Recommended pulling socks on first and then underwear.     Related Medications  ketoconazole (NIZOral) 2 % cream  To affected areas twice daily as needed        Return in July 2025 for routine skin check or return to clinic sooner if needed

## 2025-01-06 DIAGNOSIS — I10 BENIGN ESSENTIAL HYPERTENSION: ICD-10-CM

## 2025-01-06 RX ORDER — LISINOPRIL AND HYDROCHLOROTHIAZIDE 10; 12.5 MG/1; MG/1
1 TABLET ORAL DAILY
Qty: 30 TABLET | Refills: 0 | Status: SHIPPED | OUTPATIENT
Start: 2025-01-06 | End: 2025-07-05

## 2025-01-16 ENCOUNTER — APPOINTMENT (OUTPATIENT)
Dept: PRIMARY CARE | Facility: CLINIC | Age: 75
End: 2025-01-16
Payer: MEDICARE

## 2025-02-08 DIAGNOSIS — I10 BENIGN ESSENTIAL HYPERTENSION: ICD-10-CM

## 2025-02-11 RX ORDER — LISINOPRIL AND HYDROCHLOROTHIAZIDE 10; 12.5 MG/1; MG/1
1 TABLET ORAL DAILY
Qty: 30 TABLET | Refills: 0 | Status: SHIPPED | OUTPATIENT
Start: 2025-02-11 | End: 2025-08-10

## 2025-02-21 ENCOUNTER — HOSPITAL ENCOUNTER (OUTPATIENT)
Dept: RADIATION ONCOLOGY | Facility: CLINIC | Age: 75
Setting detail: RADIATION/ONCOLOGY SERIES
Discharge: HOME | End: 2025-02-21
Payer: MEDICARE

## 2025-02-21 VITALS
TEMPERATURE: 97 F | WEIGHT: 174.4 LBS | DIASTOLIC BLOOD PRESSURE: 86 MMHG | SYSTOLIC BLOOD PRESSURE: 123 MMHG | RESPIRATION RATE: 18 BRPM | BODY MASS INDEX: 29.94 KG/M2 | OXYGEN SATURATION: 96 % | HEART RATE: 70 BPM

## 2025-02-21 DIAGNOSIS — C50.112 MALIGNANT NEOPLASM OF CENTRAL PORTION OF BOTH BREASTS IN FEMALE, ESTROGEN RECEPTOR POSITIVE: ICD-10-CM

## 2025-02-21 DIAGNOSIS — Z17.0 MALIGNANT NEOPLASM OF CENTRAL PORTION OF BOTH BREASTS IN FEMALE, ESTROGEN RECEPTOR POSITIVE: ICD-10-CM

## 2025-02-21 DIAGNOSIS — C50.111 MALIGNANT NEOPLASM OF CENTRAL PORTION OF BOTH BREASTS IN FEMALE, ESTROGEN RECEPTOR POSITIVE: ICD-10-CM

## 2025-02-21 PROCEDURE — 99213 OFFICE O/P EST LOW 20 MIN: CPT | Performed by: NURSE PRACTITIONER

## 2025-02-21 ASSESSMENT — PAIN SCALES - GENERAL: PAINLEVEL_OUTOF10: 0-NO PAIN

## 2025-02-21 NOTE — PROGRESS NOTES
Radiation Oncology Follow-Up    Patient Name:  Nae Eli  MRN:  [id number]  :  1950    Referring Provider: No ref. provider found  Primary Care Provider: Bart Alonso MD  Care Team: Patient Care Team:  Bart Alonso MD as PCP - General  Bart Alonso MD as PCP - [employer] PCP    Date of Service: 2025     Cancer Staging:          Breast         AJCC Edition: 8th (AJCC), Diagnosis Date: Sep 2021, IA, pT1c pN0 cM0 G2         Breast         AJCC Edition: 8th (AJCC), Diagnosis Date: Sep 2021, IA, pT1b pN0 cM0 G2     Treatment Synopsis:    74-year-old female who appreciated a mass in her right breast in .       - 2021 she underwent bilateral mammograms.  There was a mass in the lower inner quadrant of the right breast with associated architectural distortion as well as a mass in the central left breast and a mass in the upper outer quadrant of the left  breast with associated architectural distortion.   - 2021 she underwent bilateral breast ultrasounds.  Ultrasound of the right directed at the 4 o'clock position, 2 cm from the nipple revealed a hypoechoic lobulated mass.  In the left breast at the 2 o'clock position, 2 cm from the nipple was an  anechoic mass.  At the 1 o'clock position, 6 cm from the nipple was a 0.5 x 0.7 x 0.4 cm hypoechoic mass.   - 8/10/2021 she underwent bilateral breast biopsies.  Pathology from the right revealed invasive ductal carcinoma with focal mucin production.  Estrogen receptors stained positive at greater than 95%.  Progesterone receptors stained positive at 95%.   HER-2/tori was 1+ on IHC.  Left breast core biopsy directed at the 1 o'clock position, 6 cm from the nipple revealed invasive ductal carcinoma, grade 2.  Estrogen and progesterone receptors stained positive at greater than 95%.  HER-2/tori was 1+ on IHC.     - 9/15/2021 she underwent breast conserving surgery bilaterally.  Pathology from the right revealed a  "2 cm invasive ductal carcinoma, grade 2.  No angiolymphatic invasion was present.  There was no ductal carcinoma in situ.  The resection margins were  negative with tumor microns from the lateral margin.  3 sentinel lymph nodes were removed, all of which were negative for metastatic disease (0/3).  Pathology from the left revealed a 0.9 cm invasive ductal carcinoma, grade 2.  No angiolymphatic invasion  was present.  There was no ductal carcinoma in situ.  Tumor was microns from the anterior margin of resection.  1 sentinel lymph node was removed which was negative for metastatic disease (0/1).  Her tumors were sent for MammaPrint testing and both returned  as low risk luminal A.   -12/2/2021 through 12/30/2021:  radiation therapy to right and left breast:        -The right breast received a dose of 42.56 Gray/2.66 Gray per fraction/16 fractions Treatment area #2:         - Right breast tumor bed with additional 10 Gray/2.5 Gray per fraction/4 fractions for a total of 52.56 Engle           - The left breast received a dose of 42.56 Gray/2.66 Gray per fraction/16 fractions         - The left breast tumor bed received an additional 10 Gray/2.5 Gray per fraction/4 fractions     SUBJECTIVE  History of Present Illness:   Nae Eli is here today for routine radiation follow up. She is feeling well and energy baseline. 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 Reports skin intact both breasts. No breast pain but she has some tightness in left breast. Occasional \"twinges\" in right breast with mild thickening central/medial breast. She has itching at times. No swelling of UEs and has good ROM. She continues anastrozole and no adverse effects. Had normal DEXA scan. Denies headaches, fever, chills, cough, SOB, chest pain, N/V, CI complaints or bony pain. Due for mammogram in May.    Review of Systems:    Review of Systems   All other systems reviewed and are negative.    Performance Status:   The " Karnofsky performance scale today is 100, Fully active, able to carry on all pre-disease performed without restriction (ECOG equivalent 0).      OBJECTIVE    Current Outpatient Medications:     anastrozole (Arimidex) 1 mg tablet, Take 1 tablet (1 mg total) by mouth once daily.  Swallow whole with a drink of water., Disp: 90 tablet, Rfl: 3    ascorbic acid (Vitamin C) 1,000 mg tablet, Take 1 tablet (1,000 mg) by mouth once daily., Disp: , Rfl:     cholecalciferol (Vitamin D-3) 50 MCG (2000 UT) tablet, Take 1 tablet (2,000 Units) by mouth once daily., Disp: , Rfl:     hydrocortisone 2.5 % cream, 1 Application., Disp: , Rfl:     ketoconazole (NIZOral) 2 % cream, To affected areas twice daily as needed, Disp: 60 g, Rfl: 1    lisinopriL-hydrochlorothiazide 10-12.5 mg tablet, TAKE 1 TABLET BY MOUTH ONCE DAILY, Disp: 30 tablet, Rfl: 0    metoprolol succinate XL (Toprol-XL) 25 mg 24 hr tablet, TAKE 1 TABLET BY MOUTH ONCE DAILY (DO NOT CRUSH OR CHEW, Disp: 30 tablet, Rfl: 5    rosuvastatin (Crestor) 10 mg tablet, TAKE 1 TABLET BY MOUTH EVERY EVENING, Disp: 90 tablet, Rfl: 1    triamcinolone (Kenalog) 0.1 % cream, Apply to affected areas twice daily for 2 weeks then daily for 1 week then every other day for 1 week then stop. Then may used as needed when active. When using for maintenance, use less than 14 days per month., Disp: 80 g, Rfl: 0     Physical Exam  Constitutional:       Appearance: Normal appearance.   HENT:      Head: Normocephalic and atraumatic.      Nose: Nose normal.      Mouth/Throat:      Mouth: Mucous membranes are moist.      Pharynx: Oropharynx is clear.   Eyes:      Conjunctiva/sclera: Conjunctivae normal.      Pupils: Pupils are equal, round, and reactive to light.   Cardiovascular:      Rate and Rhythm: Normal rate.   Pulmonary:      Effort: Pulmonary effort is normal.   Chest:   Breasts:     Right: Normal. No swelling, inverted nipple, mass or nipple discharge.      Left: Normal. No swelling, inverted  nipple, mass or nipple discharge.      Comments: Right breast with mild thickening central medial breast consistent with radiation changes/mild fibrosis  Abdominal:      Palpations: Abdomen is soft.   Musculoskeletal:         General: No swelling. Normal range of motion.      Cervical back: Normal range of motion and neck supple.   Lymphadenopathy:      Cervical: No cervical adenopathy.      Upper Body:      Right upper body: No supraclavicular or axillary adenopathy.      Left upper body: No supraclavicular or axillary adenopathy.   Skin:     General: Skin is warm and dry.   Neurological:      General: No focal deficit present.      Mental Status: She is alert and oriented to person, place, and time.   Psychiatric:         Mood and Affect: Mood normal.         Behavior: Behavior normal.         RESULTS:  Narrative & Impression   Interpreted By:  Alonso Ventura,   STUDY:  BI MAMMO BILATERAL SCREENING TOMOSYNTHESIS; 4/19/2024 10:30 am      ACCESSION NUMBER(S):  [id number]      ORDERING CLINICIAN:  CLINTON MARINO      INDICATION:  Screening. History of bilateral lumpectomies and radiation      COMPARISON:  None.      FINDINGS:  2D and tomosynthesis images were reviewed at 1 mm slice thickness.      Density:  There are areas of scattered fibroglandular tissue.      Expected postsurgical and radiation therapy changes are noted in both  breast. No new suspicious masses or calcifications are identified.      IMPRESSION:  No mammographic evidence of malignancy.      BI-RADS CATEGORY:  BI-RADS Category:  2 Benign.  Recommendation:  Annual Screening.  Recommended Date:  1 Year.  Laterality:  Bilateral.     ASSESSMENT/PLAN:  74 y.o. female with bilateral breast early stage breast cancer s/p breast conserving surgery followed by adjuvant radiation to both breasts. Cosmesis is excellent. Reviewed breast lymphatic massage. She will continue anastrozole and follow up with medical oncology. Radiation follow up in 12 mo. She will  follow up with Erin Broussard CNP in May for her mammogram. Call me with any questions or concerns.     Charlotte Ba CNP  [phone]

## 2025-03-13 DIAGNOSIS — I10 BENIGN ESSENTIAL HYPERTENSION: ICD-10-CM

## 2025-03-13 RX ORDER — METOPROLOL SUCCINATE 25 MG/1
25 TABLET, EXTENDED RELEASE ORAL DAILY
Qty: 30 TABLET | Refills: 5 | OUTPATIENT
Start: 2025-03-13

## 2025-03-13 RX ORDER — LISINOPRIL AND HYDROCHLOROTHIAZIDE 10; 12.5 MG/1; MG/1
1 TABLET ORAL DAILY
Qty: 30 TABLET | Refills: 0 | OUTPATIENT
Start: 2025-03-13 | End: 2025-09-09

## 2025-03-19 DIAGNOSIS — I10 BENIGN ESSENTIAL HYPERTENSION: ICD-10-CM

## 2025-03-19 RX ORDER — METOPROLOL SUCCINATE 25 MG/1
25 TABLET, EXTENDED RELEASE ORAL DAILY
Qty: 30 TABLET | Refills: 0 | Status: SHIPPED | OUTPATIENT
Start: 2025-03-19

## 2025-03-19 RX ORDER — LISINOPRIL AND HYDROCHLOROTHIAZIDE 10; 12.5 MG/1; MG/1
1 TABLET ORAL DAILY
Qty: 90 TABLET | Refills: 0 | Status: SHIPPED | OUTPATIENT
Start: 2025-03-19 | End: 2025-06-17

## 2025-03-26 ENCOUNTER — TELEPHONE (OUTPATIENT)
Dept: PRIMARY CARE | Facility: CLINIC | Age: 75
End: 2025-03-26

## 2025-03-29 LAB
ALBUMIN SERPL-MCNC: 4.5 G/DL (ref 3.6–5.1)
ALP SERPL-CCNC: 54 U/L (ref 37–153)
ALT SERPL-CCNC: 12 U/L (ref 6–29)
ANION GAP SERPL CALCULATED.4IONS-SCNC: 9 MMOL/L (CALC) (ref 7–17)
AST SERPL-CCNC: 16 U/L (ref 10–35)
BASOPHILS # BLD AUTO: 30 CELLS/UL (ref 0–200)
BASOPHILS NFR BLD AUTO: 0.6 %
BILIRUB SERPL-MCNC: 0.5 MG/DL (ref 0.2–1.2)
BUN SERPL-MCNC: 22 MG/DL (ref 7–25)
CALCIUM SERPL-MCNC: 9.8 MG/DL (ref 8.6–10.4)
CHLORIDE SERPL-SCNC: 102 MMOL/L (ref 98–110)
CHOLEST SERPL-MCNC: 151 MG/DL
CHOLEST/HDLC SERPL: 2.6 (CALC)
CO2 SERPL-SCNC: 29 MMOL/L (ref 20–32)
CREAT SERPL-MCNC: 1.2 MG/DL (ref 0.6–1)
EGFRCR SERPLBLD CKD-EPI 2021: 48 ML/MIN/1.73M2
EOSINOPHIL # BLD AUTO: 150 CELLS/UL (ref 15–500)
EOSINOPHIL NFR BLD AUTO: 3 %
ERYTHROCYTE [DISTWIDTH] IN BLOOD BY AUTOMATED COUNT: 12.2 % (ref 11–15)
GLUCOSE SERPL-MCNC: 138 MG/DL (ref 65–99)
HCT VFR BLD AUTO: 37.9 % (ref 35–45)
HDLC SERPL-MCNC: 59 MG/DL
HGB BLD-MCNC: 12.4 G/DL (ref 11.7–15.5)
LDLC SERPL CALC-MCNC: 73 MG/DL (CALC)
LYMPHOCYTES # BLD AUTO: 830 CELLS/UL (ref 850–3900)
LYMPHOCYTES NFR BLD AUTO: 16.6 %
MCH RBC QN AUTO: 31.6 PG (ref 27–33)
MCHC RBC AUTO-ENTMCNC: 32.7 G/DL (ref 32–36)
MCV RBC AUTO: 96.4 FL (ref 80–100)
MONOCYTES # BLD AUTO: 485 CELLS/UL (ref 200–950)
MONOCYTES NFR BLD AUTO: 9.7 %
NEUTROPHILS # BLD AUTO: 3505 CELLS/UL (ref 1500–7800)
NEUTROPHILS NFR BLD AUTO: 70.1 %
NONHDLC SERPL-MCNC: 92 MG/DL (CALC)
PLATELET # BLD AUTO: 371 THOUSAND/UL (ref 140–400)
PMV BLD REES-ECKER: 9.5 FL (ref 7.5–12.5)
POTASSIUM SERPL-SCNC: 4.2 MMOL/L (ref 3.5–5.3)
PROT SERPL-MCNC: 6.7 G/DL (ref 6.1–8.1)
RBC # BLD AUTO: 3.93 MILLION/UL (ref 3.8–5.1)
SODIUM SERPL-SCNC: 140 MMOL/L (ref 135–146)
TRIGL SERPL-MCNC: 108 MG/DL
TSH SERPL-ACNC: 1.41 MIU/L (ref 0.4–4.5)
WBC # BLD AUTO: 5 THOUSAND/UL (ref 3.8–10.8)

## 2025-04-08 DIAGNOSIS — I10 BENIGN ESSENTIAL HYPERTENSION: ICD-10-CM

## 2025-04-08 RX ORDER — METOPROLOL SUCCINATE 25 MG/1
25 TABLET, EXTENDED RELEASE ORAL DAILY
Qty: 30 TABLET | Refills: 0 | Status: SHIPPED | OUTPATIENT
Start: 2025-04-08

## 2025-04-16 ENCOUNTER — PATIENT OUTREACH (OUTPATIENT)
Dept: CARE COORDINATION | Facility: CLINIC | Age: 75
End: 2025-04-16
Payer: MEDICARE

## 2025-04-16 NOTE — PROGRESS NOTES
[facility] Med Adherence outreach. Introduced myself and purpose of call. Left detailed message since member identified themself on VM. Requesting return call.   Outreach call to review medication adherence related to Lisinopril/HCTC and Rosuvastatin checking if there are any barriers to receiving and taking medication.  Raquel EDWARDS, [facility]  Office Phone: [phone]

## 2025-04-17 ENCOUNTER — APPOINTMENT (OUTPATIENT)
Dept: PRIMARY CARE | Facility: CLINIC | Age: 75
End: 2025-04-17
Payer: MEDICARE

## 2025-04-17 VITALS
OXYGEN SATURATION: 98 % | BODY MASS INDEX: 29.71 KG/M2 | HEART RATE: 75 BPM | RESPIRATION RATE: 22 BRPM | WEIGHT: 174 LBS | SYSTOLIC BLOOD PRESSURE: 130 MMHG | HEIGHT: 64 IN | DIASTOLIC BLOOD PRESSURE: 80 MMHG

## 2025-04-17 DIAGNOSIS — Z00.00 MEDICARE ANNUAL WELLNESS VISIT, SUBSEQUENT: Primary | ICD-10-CM

## 2025-04-17 DIAGNOSIS — Z12.11 COLON CANCER SCREENING: ICD-10-CM

## 2025-04-17 DIAGNOSIS — I10 ACCELERATED HYPERTENSION: ICD-10-CM

## 2025-04-17 DIAGNOSIS — Z00.00 ROUTINE GENERAL MEDICAL EXAMINATION AT HEALTH CARE FACILITY: ICD-10-CM

## 2025-04-17 DIAGNOSIS — Z85.3 HISTORY OF MALIGNANT NEOPLASM OF BREAST: ICD-10-CM

## 2025-04-17 DIAGNOSIS — C51.9 VULVA CANCER (MULTI): ICD-10-CM

## 2025-04-17 DIAGNOSIS — E78.00 HYPERCHOLESTEROLEMIA: ICD-10-CM

## 2025-04-17 PROCEDURE — 1170F FXNL STATUS ASSESSED: CPT | Performed by: INTERNAL MEDICINE

## 2025-04-17 PROCEDURE — 3008F BODY MASS INDEX DOCD: CPT | Performed by: INTERNAL MEDICINE

## 2025-04-17 PROCEDURE — G0439 PPPS, SUBSEQ VISIT: HCPCS | Performed by: INTERNAL MEDICINE

## 2025-04-17 PROCEDURE — 3075F SYST BP GE 130 - 139MM HG: CPT | Performed by: INTERNAL MEDICINE

## 2025-04-17 PROCEDURE — 99214 OFFICE O/P EST MOD 30 MIN: CPT | Performed by: INTERNAL MEDICINE

## 2025-04-17 PROCEDURE — 1036F TOBACCO NON-USER: CPT | Performed by: INTERNAL MEDICINE

## 2025-04-17 PROCEDURE — 1160F RVW MEDS BY RX/DR IN RCRD: CPT | Performed by: INTERNAL MEDICINE

## 2025-04-17 PROCEDURE — 3079F DIAST BP 80-89 MM HG: CPT | Performed by: INTERNAL MEDICINE

## 2025-04-17 PROCEDURE — 1159F MED LIST DOCD IN RCRD: CPT | Performed by: INTERNAL MEDICINE

## 2025-04-17 ASSESSMENT — ENCOUNTER SYMPTOMS
CARDIOVASCULAR NEGATIVE: 1
EYES NEGATIVE: 1
ENDOCRINE NEGATIVE: 1
LOSS OF SENSATION IN FEET: 0
DEPRESSION: 0
HEMATOLOGIC/LYMPHATIC NEGATIVE: 1
ALLERGIC/IMMUNOLOGIC NEGATIVE: 1
RESPIRATORY NEGATIVE: 1
OCCASIONAL FEELINGS OF UNSTEADINESS: 1
NEUROLOGICAL NEGATIVE: 1
MUSCULOSKELETAL NEGATIVE: 1
GASTROINTESTINAL NEGATIVE: 1
CONSTITUTIONAL NEGATIVE: 1
PSYCHIATRIC NEGATIVE: 1

## 2025-04-17 ASSESSMENT — ACTIVITIES OF DAILY LIVING (ADL)
GROCERY_SHOPPING: INDEPENDENT
MANAGING_FINANCES: INDEPENDENT
BATHING: INDEPENDENT
DOING_HOUSEWORK: INDEPENDENT
DRESSING: INDEPENDENT
TAKING_MEDICATION: INDEPENDENT

## 2025-04-17 NOTE — ASSESSMENT & PLAN NOTE
HTN - hypertension well/controlled .Target BP < 130/80  achieved. Educate low salt diet and exercise with weight loss. Educate home self monitoring and diary keeping. Educate risks of elevate blood pressure and benefits of prompt treatment.  Refill Metoprolol

## 2025-04-17 NOTE — PROGRESS NOTES
"Subjective   Reason for Visit: Nae Eli is an 74 y.o. female here for a Medicare Wellness visit.          Reviewed all medications by prescribing practitioner or clinical pharmacist (such as prescriptions, OTCs, herbal therapies and supplements) and documented in the medical record.    HPI    Patient Care Team:  Bart Alonso MD as PCP - General  Bart Alonso MD as PCP - [employer] PCP     Review of Systems   Constitutional: Negative.    HENT: Negative.     Eyes: Negative.    Respiratory: Negative.     Cardiovascular: Negative.    Gastrointestinal: Negative.    Endocrine: Negative.    Musculoskeletal: Negative.    Skin: Negative.    Allergic/Immunologic: Negative.    Neurological: Negative.    Hematological: Negative.    Psychiatric/Behavioral: Negative.     All other systems reviewed and are negative.      Objective   Vitals:  /80   Pulse 75   Resp 22   Ht 1.626 m (5' 4\")   Wt 78.9 kg (174 lb)   SpO2 98%   BMI 29.87 kg/m²       Physical Exam  Vitals and nursing note reviewed.   Constitutional:       Appearance: Normal appearance.   HENT:      Head: Normocephalic and atraumatic.      Right Ear: Tympanic membrane, ear canal and external ear normal.      Left Ear: Tympanic membrane, ear canal and external ear normal. There is no impacted cerumen.      Nose: Nose normal.      Mouth/Throat:      Mouth: Mucous membranes are moist.      Pharynx: Oropharynx is clear.   Eyes:      Extraocular Movements: Extraocular movements intact.      Conjunctiva/sclera: Conjunctivae normal.      Pupils: Pupils are equal, round, and reactive to light.   Cardiovascular:      Rate and Rhythm: Normal rate and regular rhythm.      Pulses: Normal pulses.      Heart sounds: Normal heart sounds. No murmur heard.  Pulmonary:      Effort: Pulmonary effort is normal. No respiratory distress.      Breath sounds: Normal breath sounds. No stridor. No wheezing, rhonchi or rales.   Chest:      Chest wall: " No tenderness.   Abdominal:      General: Abdomen is flat. Bowel sounds are normal. There is no distension.      Palpations: Abdomen is soft. There is no mass.      Tenderness: There is no abdominal tenderness. There is no right CVA tenderness, left CVA tenderness, guarding or rebound.      Hernia: No hernia is present.   Musculoskeletal:         General: Normal range of motion.      Cervical back: Normal range of motion and neck supple.   Skin:     General: Skin is warm.      Capillary Refill: Capillary refill takes less than 2 seconds.   Neurological:      General: No focal deficit present.      Mental Status: She is alert.      Cranial Nerves: No cranial nerve deficit.      Sensory: No sensory deficit.      Motor: No weakness.      Coordination: Coordination normal.      Gait: Gait normal.      Deep Tendon Reflexes: Reflexes normal.   Psychiatric:         Mood and Affect: Mood normal.         Behavior: Behavior normal. Behavior is cooperative.         Thought Content: Thought content normal.         Judgment: Judgment normal.         Assessment & Plan  Routine general medical examination at health care facility    Orders:    1 Year Follow Up In Primary Care - Wellness Exam; Future    Colon cancer screening    Orders:    Cologuard® colon cancer screening; Future    Accelerated hypertension  HTN - hypertension well/controlled .Target BP < 130/80  achieved. Educate low salt diet and exercise with weight loss. Educate home self monitoring and diary keeping. Educate risks of elevate blood pressure and benefits of prompt treatment.  Refill Metoprolol         Hypercholesterolemia  Hypercholesterolemia - Monitor lipid profile and educate patient upon risks of high cholesterol and targets. Educate diet and change in lifestyle and increase in exercises - Refill:  Rosuvastatin  and educate compliance with medication and diet.             Medicare annual wellness visit, subsequent  No recent hospitalizations.     All  medications reviewed and reconciled by me the provider..  No use of controlled substances or opiates.     Family history, social history reviewed, no changes.     Patient does not smoke.     Patient does not drink.     Patient hydrates adequately daily.  Eats a well-balanced healthy diet.      Exercises adequately including walking and doing weightbearing exercises.     Patient denies any difficulty with memory or cognition.      Denies any difficulty with hearing.  Patient does not wear hearing aids.     No fall risk.  No recent falls.  Denies any difficulty walking.     Patient with no history of depression anxiety, denies any loss of interest, no feeling of sadness, no lack of motivation.     Patient is independent in all ADLs and IADLs.  Independent bathing, dressing, walking.  Takes care of own finances, shopping and cooking.      End-of-life decision-making power of  reviewed with patient.      Risk Factors Identified During Visit: None.   Influenza: influenza vaccine was previously given.   Pneumovax 23: Pneumovax 23 vaccine was previously given.   Prevnar 13: Prevnar 13 vaccine was previously given.   Shingles Vaccine: Shingles vaccine was previously given.   Colorectal Cancer Screening: screening is current.   Abdominal Aortic Aneurysm screening: screening is current.   HIV screening: screening not indicated.        Preventive measures - Recommend ASAP : PAP/Mamogram and refer patient to GYN. Specialist. Last Colonoscopy was normal in 2015  (educate patient risks of colon cancer) refer patient to GI specialist. Ophthalmology and retina exam recommend yearly exams refer patient to an Ophthalmologist.         History of malignant neoplasm of breast  Follows with oncology and breast spoecialist         Vulva cancer (Multi)                Cardiac Risk Assessment  15 - 20 minutes were spent discussing Cardiovascular risk and, if needed, lifestyle modifications were recommended, including nutritional  choices, exercise, and elimination of habits contributing to risk.   Aspirin use/disuse was discussed following the guidelines below:  low dose ASA ( mg) should be considered:    If prior Heart Attack/Stroke/Peripheral vascular disease:  Generally recommend daily low dose aspirin unless extremely high bleeding risk (e.g., gastrointestinal).    If no prior Heart Attack/Stroke/Peripheral vascular disease:              Age over 70: Do not use Aspirin for prevention    Age less than 70 and 10-year cardiovascular disease risk is >20%: use low dose Aspirin for prevention.

## 2025-05-09 ENCOUNTER — OFFICE VISIT (OUTPATIENT)
Dept: HEMATOLOGY/ONCOLOGY | Facility: CLINIC | Age: 75
End: 2025-05-09
Payer: MEDICARE

## 2025-05-09 ENCOUNTER — HOSPITAL ENCOUNTER (OUTPATIENT)
Dept: RADIOLOGY | Facility: CLINIC | Age: 75
Discharge: HOME | End: 2025-05-09
Payer: MEDICARE

## 2025-05-09 VITALS
TEMPERATURE: 97.2 F | SYSTOLIC BLOOD PRESSURE: 125 MMHG | OXYGEN SATURATION: 96 % | DIASTOLIC BLOOD PRESSURE: 80 MMHG | BODY MASS INDEX: 30.16 KG/M2 | HEART RATE: 70 BPM | WEIGHT: 175.71 LBS

## 2025-05-09 DIAGNOSIS — Z51.81 ENCOUNTER FOR MONITORING AROMATASE INHIBITOR THERAPY: ICD-10-CM

## 2025-05-09 DIAGNOSIS — C50.311 MALIGNANT NEOPLASM OF LOWER-INNER QUADRANT OF RIGHT BREAST OF FEMALE, ESTROGEN RECEPTOR POSITIVE: ICD-10-CM

## 2025-05-09 DIAGNOSIS — Z79.811 ENCOUNTER FOR MONITORING AROMATASE INHIBITOR THERAPY: ICD-10-CM

## 2025-05-09 DIAGNOSIS — Z85.3 ENCOUNTER FOR FOLLOW-UP SURVEILLANCE OF BREAST CANCER: ICD-10-CM

## 2025-05-09 DIAGNOSIS — I10 BENIGN ESSENTIAL HYPERTENSION: ICD-10-CM

## 2025-05-09 DIAGNOSIS — Z08 ENCOUNTER FOR FOLLOW-UP SURVEILLANCE OF BREAST CANCER: ICD-10-CM

## 2025-05-09 DIAGNOSIS — E78.00 HYPERCHOLESTEROLEMIA: ICD-10-CM

## 2025-05-09 DIAGNOSIS — Z12.31 BREAST CANCER SCREENING BY MAMMOGRAM: ICD-10-CM

## 2025-05-09 DIAGNOSIS — Z92.3 HISTORY OF EXTERNAL BEAM RADIATION THERAPY: ICD-10-CM

## 2025-05-09 DIAGNOSIS — Z17.0 MALIGNANT NEOPLASM OF LOWER-INNER QUADRANT OF RIGHT BREAST OF FEMALE, ESTROGEN RECEPTOR POSITIVE: ICD-10-CM

## 2025-05-09 DIAGNOSIS — Z12.31 ENCOUNTER FOR SCREENING MAMMOGRAM FOR MALIGNANT NEOPLASM OF BREAST: Primary | ICD-10-CM

## 2025-05-09 PROCEDURE — 1159F MED LIST DOCD IN RCRD: CPT | Performed by: NURSE PRACTITIONER

## 2025-05-09 PROCEDURE — 3074F SYST BP LT 130 MM HG: CPT | Performed by: NURSE PRACTITIONER

## 2025-05-09 PROCEDURE — 77067 SCR MAMMO BI INCL CAD: CPT

## 2025-05-09 PROCEDURE — 99214 OFFICE O/P EST MOD 30 MIN: CPT | Performed by: NURSE PRACTITIONER

## 2025-05-09 PROCEDURE — 3079F DIAST BP 80-89 MM HG: CPT | Performed by: NURSE PRACTITIONER

## 2025-05-09 PROCEDURE — 1126F AMNT PAIN NOTED NONE PRSNT: CPT | Performed by: NURSE PRACTITIONER

## 2025-05-09 PROCEDURE — 1160F RVW MEDS BY RX/DR IN RCRD: CPT | Performed by: NURSE PRACTITIONER

## 2025-05-09 RX ORDER — METOPROLOL SUCCINATE 25 MG/1
25 TABLET, EXTENDED RELEASE ORAL DAILY
Qty: 90 TABLET | Refills: 0 | Status: SHIPPED | OUTPATIENT
Start: 2025-05-09

## 2025-05-09 RX ORDER — ROSUVASTATIN CALCIUM 10 MG/1
10 TABLET, COATED ORAL NIGHTLY
Qty: 90 TABLET | Refills: 0 | Status: SHIPPED | OUTPATIENT
Start: 2025-05-09

## 2025-05-09 ASSESSMENT — PATIENT HEALTH QUESTIONNAIRE - PHQ9
SUM OF ALL RESPONSES TO PHQ9 QUESTIONS 1 & 2: 0
1. LITTLE INTEREST OR PLEASURE IN DOING THINGS: NOT AT ALL
2. FEELING DOWN, DEPRESSED OR HOPELESS: NOT AT ALL

## 2025-05-09 ASSESSMENT — PAIN SCALES - GENERAL: PAINLEVEL_OUTOF10: 0-NO PAIN

## 2025-05-09 NOTE — PATIENT INSTRUCTIONS
Please call us at [phone] then follow the prompts.    1. Exercise 2.5 hours per week; bone strengthening, cardio-vascular, resistance training.  2. Please do self breast exams monthly.  3. Keep alcohol under 3 drinks per week.  4. Sun safety - limit sun exposure from 11a-2p when its at its hottest, apply 15-30 sun block and re-apply every 1-2 hours if perspiring or swimming.  5. Eat a plant based diet, add in oily fishes such as mackerel, tuna, and salmon.  6. Get in at least 1,000 mg of calcium per day through diet or supplement for bone strength. Examples of foods higher in calcium are milk, yogurt, fruited yogurt, oranges, fortified orange juice, almonds, almond milk, broccoli, spinach, bok jeanne, mustard greens, puddings, custards, ice cream, fortified cereals, bars, and crackers.   7. Please continue on your anastrozole 1 mg tablet daily  8. Please call the office if any new mass or rash in or around breast, or any uncontrolled symptoms that last over 2-3 weeks at [phone].  9. Team will reach out to you for all abnormal results. Mammogram results can take up to 7 days to result in EPIC/Mychart. Please call the office if you had additional testing done and need to review the results at [phone].   10. It was nice seeing you today, Nae . I will see you back 1 yr. Thank you for choosing [facility] with your care.

## 2025-05-09 NOTE — PROGRESS NOTES
Oncology Follow-Up    Nae Eli  [id number]             Breast         AJCC Edition: 8th (AJCC), Diagnosis Date: Sep 2021, IA, pT1b pN0 cM0 G2           AJCC Edition: 8th (AJCC), Diagnosis Date: Sep 2021, IA, pT1c pN0 cM0 G2     Nae Eli is a very pleasant 72 year old  female with a history of bilateral breast cancer diagnosed in August 2021.   1. She underwent bilateral breast lumpectomies and bilateral sentinel lymph node biopsies in September 2021.   2. Right side: 2 cm invasive ductal carcinoma, grade 2, ER+>95%, GA+95%, HER2 negative, 0/3 sentinel lymph nodes involved, T1cN0, Stage IA. Mammaprint  low risk luminal A +0.243.   3. Left side: 0.9 cm invasive ductal carcinoma, grade 2, ER+>95%, GA+95%, HER2 negative, 0/1 sentinel lymph nodes involved, T1bN0, Stage IA.  3. Mammaprint low risk luminal A +0.015.  4. She completed radiation to both sides in February 2022.  5. She initiated endocrine therapy with Anastrozole February 2022 and continues on anastrozole.      Subjective    ***        Objective      Vitals:    05/09/25 1104   BP: 125/80   Pulse: 70   Temp: 36.2 °C (97.2 °F)   SpO2: 96%        Constitutional: Well developed, alert/oriented x3, no distress, cooperative   Eyes: clear sclera   ENMT: mucous membranes moist, no apparent lesions   Head/Neck: Neck supple, no bruits   Respiratory/Thorax: Patent airways, normal breath sounds with good chest expansion   Cardiovascular: Regular rate and rhythm, no murmurs, 2+ equal pulses of the extremities,   Gastrointestinal: Nondistended, soft, non-tender, no masses palpable, no organomegaly   Musculoskeletal: ROM intact, no joint swelling, normal strength   Extremities: normal extremities, no edema, cyanosis, contusions or wounds   Neurological: alert and oriented x3,  normal strength   Breast:  left breast and axilla without masses, nodules, skin changes.  Right breast and axilla without masses nodules, skin changes mammoplasty is without  masses, nodules, skin changes, discharge, well healed incisions.   Lymphatic: No significant lymphadenopathy   Psychological: Appropriate mood and behavior   Skin: Warm and dry, no lesions, no rashes      Physical Exam     Lab Results   Component Value Date    WBC 5.0 03/28/2025    HGB 12.4 03/28/2025    HCT 37.9 03/28/2025    MCV 96.4 03/28/2025     03/28/2025       Chemistry    Lab Results   Component Value Date/Time     03/28/2025 1229    K 4.2 03/28/2025 1229     03/28/2025 1229    CO2 29 03/28/2025 1229    BUN 22 03/28/2025 1229    CREATININE 1.20 (H) 03/28/2025 1229    Lab Results   Component Value Date/Time    CALCIUM 9.8 03/28/2025 1229    ALKPHOS 54 03/28/2025 1229    AST 16 03/28/2025 1229    ALT 12 03/28/2025 1229    BILITOT 0.5 03/28/2025 1229              Imaging:  ***        Assessment/Plan    Diagnoses and all orders for this visit:  Malignant neoplasm of lower-inner quadrant of right breast of female, estrogen receptor positive  -     Clinic Appointment Request Follow Up; CLINTON MARINO      ***    JUNE Vanegas-CNP      "itching and \"zingers\" - related to nerve damage. Advised not to scratch, keep well moisturized.   Continue anastrozole 1mg daily.  Encouraged monthly breast self exams, plant based diet, keep alcohol <3 drinks/week, exercise at least 2.5 hours/week.  We reviewed signs/symptoms of recurrence including new masses, new pigmented lesion, tugging or pulling of the skin, nipple discharge, rash in or around the chest area, or any new finding that doesn't resolve within a 2-3 weeks.  All of Nae's questions/concerns were addressed.  Over 25 minutes of time was spent with this patient with >50% of the time with education, counseling, and coordination of care.   Maci will see Charlotte Ba NP in November. I will see her back in one year with her mammogram. She will call with any concerns.      Diagnoses and all orders for this visit:  Encounter for screening mammogram for malignant neoplasm of breast  -     Clinic Appointment Request Follow Up; CLINTON MARINO; Future  -     BI mammo bilateral screening tomosynthesis; Future  Malignant neoplasm of lower-inner quadrant of right breast of female, estrogen receptor positive  -     Clinic Appointment Request Follow Up; CLINTON MARINO  -     Clinic Appointment Request Follow Up; CLINTON MARINO; Future  -     BI mammo bilateral screening tomosynthesis; Future  Encounter for monitoring aromatase inhibitor therapy  History of external beam radiation therapy  Encounter for follow-up surveillance of breast cancer        JUNE Vanegas-CNP     "

## 2025-05-14 PROBLEM — Z85.3 ENCOUNTER FOR FOLLOW-UP SURVEILLANCE OF BREAST CANCER: Status: ACTIVE | Noted: 2025-05-14

## 2025-05-14 PROBLEM — Z12.31 ENCOUNTER FOR SCREENING MAMMOGRAM FOR MALIGNANT NEOPLASM OF BREAST: Status: ACTIVE | Noted: 2023-10-13

## 2025-05-14 PROBLEM — Z92.3 HISTORY OF EXTERNAL BEAM RADIATION THERAPY: Status: ACTIVE | Noted: 2025-05-14

## 2025-05-14 PROBLEM — Z08 ENCOUNTER FOR FOLLOW-UP SURVEILLANCE OF BREAST CANCER: Status: ACTIVE | Noted: 2025-05-14

## 2025-05-14 RX ORDER — ANASTROZOLE 1 MG/1
1 TABLET ORAL DAILY
Qty: 90 TABLET | Refills: 3 | Status: SHIPPED | OUTPATIENT
Start: 2025-05-14

## 2025-07-18 ENCOUNTER — APPOINTMENT (OUTPATIENT)
Dept: DERMATOLOGY | Facility: CLINIC | Age: 75
End: 2025-07-18
Payer: MEDICARE

## 2025-07-18 DIAGNOSIS — L57.0 ACTINIC KERATOSIS: ICD-10-CM

## 2025-07-18 DIAGNOSIS — L30.9 DERMATITIS: ICD-10-CM

## 2025-07-18 DIAGNOSIS — L57.9 SKIN CHANGES DUE TO CHRONIC EXPOSURE TO NONIONIZING RADIATION: ICD-10-CM

## 2025-07-18 DIAGNOSIS — L82.0 INFLAMED SEBORRHEIC KERATOSIS: ICD-10-CM

## 2025-07-18 DIAGNOSIS — D18.01 ANGIOMA OF SKIN: Primary | ICD-10-CM

## 2025-07-18 DIAGNOSIS — D22.9 BENIGN NEVUS: ICD-10-CM

## 2025-07-18 DIAGNOSIS — L82.1 SEBORRHEIC KERATOSIS: ICD-10-CM

## 2025-07-18 DIAGNOSIS — L81.4 LENTIGO: ICD-10-CM

## 2025-07-18 PROCEDURE — 17003 DESTRUCT PREMALG LES 2-14: CPT | Performed by: NURSE PRACTITIONER

## 2025-07-18 PROCEDURE — 1159F MED LIST DOCD IN RCRD: CPT | Performed by: NURSE PRACTITIONER

## 2025-07-18 PROCEDURE — 1036F TOBACCO NON-USER: CPT | Performed by: NURSE PRACTITIONER

## 2025-07-18 PROCEDURE — 17000 DESTRUCT PREMALG LESION: CPT | Performed by: NURSE PRACTITIONER

## 2025-07-18 PROCEDURE — 17110 DESTRUCTION B9 LES UP TO 14: CPT | Performed by: NURSE PRACTITIONER

## 2025-07-18 PROCEDURE — 1160F RVW MEDS BY RX/DR IN RCRD: CPT | Performed by: NURSE PRACTITIONER

## 2025-07-18 PROCEDURE — 99213 OFFICE O/P EST LOW 20 MIN: CPT | Performed by: NURSE PRACTITIONER

## 2025-07-18 NOTE — PROGRESS NOTES
Subjective     Nae Eli is a 75 y.o. female who presents for the following: Skin Check.     Right temple lesion is irritated.     Review of Systems:  No other skin or systemic complaints other than what is documented elsewhere in the note.    The following portions of the chart were reviewed this encounter and updated as appropriate:   Tobacco  Allergies  Meds  Problems  Med Hx  Surg Hx         Skin Cancer History  Biopsy Log Book  No skin cancers from Specimen Tracking.    Additional History      Specialty Problems          Dermatology Problems    Actinic keratosis    Dermatitis, unspecified    Hemangioma of skin and subcutaneous tissue    Melanocytic nevi of trunk    Melanocytic nevi of unspecified lower limb, including hip    Melanocytic nevi of unspecified upper limb, including shoulder    Other benign neoplasm of skin, unspecified    Other hypertrophic disorders of the skin    Other melanin hyperpigmentation    Other seborrheic keratosis    Other specified follicular disorders    Xerosis cutis    Acne rosacea, papular type        Objective   Well appearing patient in no apparent distress; mood and affect are within normal limits.    A full examination was performed including scalp, head, eyes, ears, nose, lips, neck, chest, axillae, abdomen, back, buttocks, bilateral upper extremities, bilateral lower extremities, hands, feet, fingers, toes, fingernails, and toenails. All findings within normal limits unless otherwise noted below.      Assessment/Plan   Skin Exam  1. ANGIOMA OF SKIN  Generalized  Scattered cherry-red papule(s).  A cherry hemangioma is a small macule (small, flat, smooth area) or papule (small, solid bump) formed from an overgrowth of tiny blood vessels in the skin. Cherry hemangiomas are characteristically red or purplish in color. They often first appear in middle adulthood and usually increase in number with age. Cherry hemangiomas are noncancerous (benign) and are common in  adults.    The present appearance of the lesion is not worrisome but it should continue to be observed and testing/treatment may be warranted if change occurs.  2. BENIGN NEVUS  Generalized  Scattered, uniform and benign-appearing, regular brown melanocytic papules and macules.  The present appearance of the lesion is not worrisome but it should continue to be observed and testing/treatment may be warranted if change occurs.  3. LENTIGO  Generalized  Scattered tan macules in sun-exposed areas.  A solar lentigo (plural, solar lentigines), also known as a sun-induced freckle or senile lentigo, is a dark (hyperpigmented) lesion caused by natural or artificial ultraviolet (UV) light. Solar lentigines may be single or multiple. This type of lentigo is different from a simple lentigo (lentigo simplex) because it is caused by exposure to UV light. Solar lentigines are benign, but they do indicate excessive sun exposure, a risk factor for the development of skin cancer.    To prevent solar lentigines, avoid exposure to sunlight in midday (10 AM to 3 PM), wear sun-protective clothing (tightly woven clothes and hats), and apply sunscreen (SPF 30 UVA and UVB block).    The present appearance of the lesion is not worrisome but it should continue to be observed and testing/treatment may be warranted if change occurs.  4. SEBORRHEIC KERATOSIS  Generalized  Stuck on verrucous, tan-brown papules and plaques.    Seborrheic keratoses are common noncancerous (benign) growths of unknown cause seen in adults due to a thickening of an area of the top skin layer. Seborrheic keratoses may appear as if they are stuck on to the skin. They have distinct borders, and they may appear as papules (small, solid bumps) or plaques (solid, raised patches that are bigger than a thumbnail). They may be the same color as your skin, or they may be pink, light brown, darker brown, or very dark brown, or sometimes may appear black.    There is no way to  prevent new seborrheic keratoses from forming. Seborrheic keratoses can be removed, but removal is considered a cosmetic issue and is usually not covered by insurance.    PLAN  No treatment is needed unless there is irritation from clothing, such as itching or bleeding.  2.   Some lotions containing alpha hydroxy acids, salicylic acid, or urea may make the areas feel smoother with regular use but will not eliminate them.  5. SKIN CHANGES DUE TO CHRONIC EXPOSURE TO NONIONIZING RADIATION  Generalized  Actinic changes in the form of freckles, lentigines and hyper/hypopigmentation   ABCDEs of melanoma and atypical moles were discussed with the patient.    Patient was instructed to perform monthly self skin examination.  We recommended that the patient have regular full skin exams given an increased risk of subsequent skin cancers.    The patient was instructed to use sun protective behaviors including use of broad spectrum sunscreens and sun protective clothing to reduce risk of skin cancers.    Warning signs of non-melanoma skin cancer discussed.  6. DERMATITIS  Nipples, Ears  No red or pink scaly areas noted.   Hx of eczema dx in the ears previously. Also hx of radiation to bilateral breast. Patient reports HTC 2.5% cream does clear up the rash. She reports going 4-6 weeks between reoccurrence and need of using topical steroid. She does endorse skin does return to normal and is gone for a while. Continue with using topical steroids. May use TAC cream BID PRN.  Avoid using more than 14 days a month.     The following information regarding the use of topical steroids was provided: Local skin thinning,striae, and telangiectasia can occur with chronic application of this medication.  Long term use oftopical steroids should be avoided    7. ACTINIC KERATOSIS (7)  Dorsum of Nose, Left Buccal Cheek, Left Parotid Area, Right Buccal Cheek (2), Right Malar Cheek, Right Zygomatic Area  Thin erythematous papules with gritty  scale  WHAT IS ACTINIC KERATOSIS?   - Actinic keratosis (AK) is a skin condition caused by sun damage. It causes scaly, rough, or bumpy spots on the skin.  - If left alone, AKs may turn into a skin cancer. People who burn easily or have trouble tanning are at more risk for developing AKs.   - There is no one test for AKs and diagnosis is made by clinical appearance. Treatment options include cryotherapy, therapy with lights, and various creams (e.g., topical 5-fluorocuracil, imiquimod).       To lower the chance of getting AK, you can:       ?  Stay out of the sun in the middle of the day (from 10 a.m. to 4 p.m.)       ?  Wear sunscreen - An SPF of at least 30 is best. The SPF number is on the sunscreen bottle or tube.       ?  Wear a wide-brimmed hat, long-sleeved shirt, long pants, or long skirt outside. A baseball hat does not give much protection.        ?  Do not use tanning beds.        ?  Keep a low-fat diet, less than 21% of calories should come from fat       ?  Take Vitamin B3 (nicotinomide) 500mg twice daily.      YOUR TREATMENT PLAN  - At this time I recommend treatment with cryotherapy.  - Possible side effects of liquid nitrogen treatment reviewed including formation of blisters, crusting, tenderness, scar, and discoloration which may be permanent.  - Patient advised to return the office for re-evaluation if the treated lesion(s) do not resolve within 4-6 weeks. Patient verbalizes understanding.  - Destr of lesion - Dorsum of Nose, Left Buccal Cheek, Left Parotid Area, Right Buccal Cheek (2), Right Malar Cheek, Right Zygomatic Area  Complexity: simple    Destruction method: cryotherapy    Informed consent: discussed and consent obtained    Timeout:  patient name, date of birth, surgical site, and procedure verified  Lesion destroyed using liquid nitrogen: Yes    Cryotherapy cycles:  2  Outcome: patient tolerated procedure well with no complications    Post-procedure details: wound care instructions  given      8. INFLAMED SEBORRHEIC KERATOSIS  Right Temple  Erythematous excoriated crusty verrucal papule(s) and/or plaque(s)  Inflamed Seborrheic Keratosis  - Benign nature of lesion(s) discussed with patient, and reassurance provided.  - Given the lesion (s) are very itchy or irritated, the patient is seeking removal and treatment with liquid nitrogen cryotherapy in clinic today was recommended.  - The patient expressed understanding, is in agreement with this plan, and wishes to proceed with liquid nitrogen cryotherapy as documented above.  - Destr of lesion - Right Temple  Complexity: simple    Destruction method: cryotherapy    Timeout:  patient name, date of birth, surgical site, and procedure verified  Lesion destroyed using liquid nitrogen: Yes    Region frozen until ice ball extended beyond lesion: Yes    Cryotherapy cycles:  3  Outcome: patient tolerated procedure well with no complications      Return to clinic in 1 year for skin check/follow up or sooner if needed

## 2025-07-18 NOTE — Clinical Note
Inflamed Seborrheic Keratosis  - Benign nature of lesion(s) discussed with patient, and reassurance provided.  - Given the lesion (s) are very itchy or irritated, the patient is seeking removal and treatment with liquid nitrogen cryotherapy in clinic today was recommended.  - The patient expressed understanding, is in agreement with this plan, and wishes to proceed with liquid nitrogen cryotherapy as documented above.

## 2025-07-18 NOTE — PATIENT INSTRUCTIONS

## 2025-07-18 NOTE — Clinical Note
Hx of eczema dx in the ears previously. Also hx of radiation to bilateral breast. Patient reports HTC 2.5% cream does clear up the rash. She reports going 4-6 weeks between reoccurrence and need of using topical steroid. She does endorse skin does return to normal and is gone for a while. Continue with using topical steroids. May use TAC cream BID PRN.  Avoid using more than 14 days a month.     The following information regarding the use of topical steroids was provided: Local skin thinning,striae, and telangiectasia can occur with chronic application of this medication.  Long term use oftopical steroids should be avoided

## 2025-07-21 DIAGNOSIS — I10 BENIGN ESSENTIAL HYPERTENSION: ICD-10-CM

## 2025-07-21 RX ORDER — LISINOPRIL AND HYDROCHLOROTHIAZIDE 10; 12.5 MG/1; MG/1
1 TABLET ORAL DAILY
Qty: 90 TABLET | Refills: 1 | Status: SHIPPED | OUTPATIENT
Start: 2025-07-21 | End: 2025-10-24

## 2025-08-06 DIAGNOSIS — I10 BENIGN ESSENTIAL HYPERTENSION: ICD-10-CM

## 2025-08-06 RX ORDER — METOPROLOL SUCCINATE 25 MG/1
25 TABLET, EXTENDED RELEASE ORAL DAILY
Qty: 90 TABLET | Refills: 0 | Status: SHIPPED | OUTPATIENT
Start: 2025-08-06

## 2025-08-07 DIAGNOSIS — E78.00 HYPERCHOLESTEROLEMIA: ICD-10-CM

## 2025-08-07 RX ORDER — ROSUVASTATIN CALCIUM 10 MG/1
10 TABLET, COATED ORAL NIGHTLY
Qty: 90 TABLET | Refills: 0 | Status: SHIPPED | OUTPATIENT
Start: 2025-08-07

## 2025-10-16 ENCOUNTER — APPOINTMENT (OUTPATIENT)
Dept: PRIMARY CARE | Facility: CLINIC | Age: 75
End: 2025-10-16
Payer: MEDICARE

## 2025-10-24 ENCOUNTER — APPOINTMENT (OUTPATIENT)
Dept: PRIMARY CARE | Facility: CLINIC | Age: 75
End: 2025-10-24
Payer: MEDICARE

## 2026-04-21 ENCOUNTER — APPOINTMENT (OUTPATIENT)
Dept: PRIMARY CARE | Facility: CLINIC | Age: 76
End: 2026-04-21
Payer: MEDICARE

## 2026-07-24 ENCOUNTER — APPOINTMENT (OUTPATIENT)
Dept: DERMATOLOGY | Facility: CLINIC | Age: 76
End: 2026-07-24
Payer: MEDICARE